# Patient Record
Sex: MALE | Race: WHITE | Employment: OTHER | URBAN - METROPOLITAN AREA
[De-identification: names, ages, dates, MRNs, and addresses within clinical notes are randomized per-mention and may not be internally consistent; named-entity substitution may affect disease eponyms.]

---

## 2022-12-30 ENCOUNTER — HOSPITAL ENCOUNTER (INPATIENT)
Age: 82
LOS: 1 days | Discharge: HOME | DRG: 091 | End: 2022-12-31
Attending: EMERGENCY MEDICINE | Admitting: INTERNAL MEDICINE
Payer: MEDICARE

## 2022-12-30 ENCOUNTER — APPOINTMENT (OUTPATIENT)
Dept: CT IMAGING | Age: 82
DRG: 091 | End: 2022-12-30
Payer: MEDICARE

## 2022-12-30 ENCOUNTER — APPOINTMENT (OUTPATIENT)
Dept: GENERAL RADIOLOGY | Age: 82
DRG: 091 | End: 2022-12-30
Payer: MEDICARE

## 2022-12-30 ENCOUNTER — APPOINTMENT (OUTPATIENT)
Dept: ULTRASOUND IMAGING | Age: 82
DRG: 091 | End: 2022-12-30
Payer: MEDICARE

## 2022-12-30 DIAGNOSIS — R41.0 CONFUSION: Primary | ICD-10-CM

## 2022-12-30 PROBLEM — R41.82 ALTERED MENTAL STATUS, UNSPECIFIED ALTERED MENTAL STATUS TYPE: Status: ACTIVE | Noted: 2022-12-30

## 2022-12-30 PROBLEM — G93.40 ACUTE ENCEPHALOPATHY: Status: ACTIVE | Noted: 2022-12-30

## 2022-12-30 LAB
ABSOLUTE EOS #: 0.11 K/UL (ref 0–0.44)
ABSOLUTE IMMATURE GRANULOCYTE: <0.03 K/UL (ref 0–0.3)
ABSOLUTE LYMPH #: 1.37 K/UL (ref 1.1–3.7)
ABSOLUTE MONO #: 0.46 K/UL (ref 0.1–1.2)
ALBUMIN SERPL-MCNC: 3.9 G/DL (ref 3.5–5.2)
ALBUMIN/GLOBULIN RATIO: 1.5 (ref 1–2.5)
ALP BLD-CCNC: 73 U/L (ref 40–129)
ALT SERPL-CCNC: 10 U/L (ref 5–41)
AMMONIA: 16 UMOL/L (ref 16–60)
ANION GAP SERPL CALCULATED.3IONS-SCNC: 11 MMOL/L (ref 9–17)
AST SERPL-CCNC: 17 U/L
BASOPHILS # BLD: 1 % (ref 0–2)
BASOPHILS ABSOLUTE: 0.04 K/UL (ref 0–0.2)
BILIRUB SERPL-MCNC: 0.3 MG/DL (ref 0.3–1.2)
BILIRUBIN DIRECT: 0.1 MG/DL
BILIRUBIN URINE: NEGATIVE
BILIRUBIN, INDIRECT: 0.2 MG/DL (ref 0–1)
BUN BLDV-MCNC: 15 MG/DL (ref 8–23)
CALCIUM SERPL-MCNC: 8.9 MG/DL (ref 8.6–10.4)
CARBOXYHEMOGLOBIN: 5.3 % (ref 0–5)
CASTS UA: ABNORMAL /LPF (ref 0–2)
CASTS UA: ABNORMAL /LPF (ref 0–2)
CHLORIDE BLD-SCNC: 101 MMOL/L (ref 98–107)
CO2: 25 MMOL/L (ref 20–31)
COLOR: YELLOW
CREAT SERPL-MCNC: 1.02 MG/DL (ref 0.7–1.2)
CRYSTALS, UA: ABNORMAL /HPF
CRYSTALS, UA: ABNORMAL /HPF
EOSINOPHILS RELATIVE PERCENT: 2 % (ref 1–4)
EPITHELIAL CELLS UA: ABNORMAL /HPF (ref 0–5)
FIO2: ABNORMAL
GFR SERPL CREATININE-BSD FRML MDRD: >60 ML/MIN/1.73M2
GLUCOSE BLD-MCNC: 148 MG/DL (ref 75–110)
GLUCOSE BLD-MCNC: 158 MG/DL (ref 70–99)
GLUCOSE URINE: NEGATIVE
HCO3 VENOUS: 24.9 MMOL/L (ref 24–30)
HCT VFR BLD CALC: 42.9 % (ref 40.7–50.3)
HEMOGLOBIN: 14.2 G/DL (ref 13–17)
IMMATURE GRANULOCYTES: 0 %
KETONES, URINE: NEGATIVE
LEUKOCYTE ESTERASE, URINE: NEGATIVE
LYMPHOCYTES # BLD: 27 % (ref 24–43)
MCH RBC QN AUTO: 32.4 PG (ref 25.2–33.5)
MCHC RBC AUTO-ENTMCNC: 33.1 G/DL (ref 28.4–34.8)
MCV RBC AUTO: 97.9 FL (ref 82.6–102.9)
MONOCYTES # BLD: 9 % (ref 3–12)
MUCUS: ABNORMAL
NEGATIVE BASE EXCESS, VEN: 1.4 MMOL/L (ref 0–2)
NITRITE, URINE: NEGATIVE
NRBC AUTOMATED: 0 PER 100 WBC
O2 SAT, VEN: 64.9 % (ref 60–85)
PATIENT TEMP: 37
PCO2, VEN: 50.5 MM HG (ref 39–55)
PDW BLD-RTO: 14.4 % (ref 11.8–14.4)
PH UA: 6 (ref 5–8)
PH VENOUS: 7.31 (ref 7.32–7.42)
PLATELET # BLD: 158 K/UL (ref 138–453)
PMV BLD AUTO: 10.2 FL (ref 8.1–13.5)
PO2, VEN: 34.5 MM HG (ref 30–50)
POTASSIUM SERPL-SCNC: 4.5 MMOL/L (ref 3.7–5.3)
PROTEIN UA: ABNORMAL
RBC # BLD: 4.38 M/UL (ref 4.21–5.77)
RBC UA: ABNORMAL /HPF (ref 0–2)
SEG NEUTROPHILS: 61 % (ref 36–65)
SEGMENTED NEUTROPHILS ABSOLUTE COUNT: 3.09 K/UL (ref 1.5–8.1)
SODIUM BLD-SCNC: 137 MMOL/L (ref 135–144)
SPECIFIC GRAVITY UA: 1.02 (ref 1–1.03)
TOTAL PROTEIN: 6.5 G/DL (ref 6.4–8.3)
TROPONIN, HIGH SENSITIVITY: 25 NG/L (ref 0–22)
TROPONIN, HIGH SENSITIVITY: 25 NG/L (ref 0–22)
TSH SERPL DL<=0.05 MIU/L-ACNC: 2.85 UIU/ML (ref 0.3–5)
TURBIDITY: CLEAR
URINE HGB: NEGATIVE
UROBILINOGEN, URINE: NORMAL
WBC # BLD: 5.1 K/UL (ref 3.5–11.3)
WBC UA: ABNORMAL /HPF (ref 0–5)

## 2022-12-30 PROCEDURE — 87040 BLOOD CULTURE FOR BACTERIA: CPT

## 2022-12-30 PROCEDURE — 80048 BASIC METABOLIC PNL TOTAL CA: CPT

## 2022-12-30 PROCEDURE — 84443 ASSAY THYROID STIM HORMONE: CPT

## 2022-12-30 PROCEDURE — 81001 URINALYSIS AUTO W/SCOPE: CPT

## 2022-12-30 PROCEDURE — 99285 EMERGENCY DEPT VISIT HI MDM: CPT

## 2022-12-30 PROCEDURE — 82805 BLOOD GASES W/O2 SATURATION: CPT

## 2022-12-30 PROCEDURE — 84484 ASSAY OF TROPONIN QUANT: CPT

## 2022-12-30 PROCEDURE — 87086 URINE CULTURE/COLONY COUNT: CPT

## 2022-12-30 PROCEDURE — 36415 COLL VENOUS BLD VENIPUNCTURE: CPT

## 2022-12-30 PROCEDURE — 93005 ELECTROCARDIOGRAM TRACING: CPT | Performed by: STUDENT IN AN ORGANIZED HEALTH CARE EDUCATION/TRAINING PROGRAM

## 2022-12-30 PROCEDURE — 2580000003 HC RX 258: Performed by: STUDENT IN AN ORGANIZED HEALTH CARE EDUCATION/TRAINING PROGRAM

## 2022-12-30 PROCEDURE — 2500000003 HC RX 250 WO HCPCS: Performed by: STUDENT IN AN ORGANIZED HEALTH CARE EDUCATION/TRAINING PROGRAM

## 2022-12-30 PROCEDURE — 82140 ASSAY OF AMMONIA: CPT

## 2022-12-30 PROCEDURE — 70450 CT HEAD/BRAIN W/O DYE: CPT

## 2022-12-30 PROCEDURE — 2060000000 HC ICU INTERMEDIATE R&B

## 2022-12-30 PROCEDURE — 82947 ASSAY GLUCOSE BLOOD QUANT: CPT

## 2022-12-30 PROCEDURE — 1200000000 HC SEMI PRIVATE

## 2022-12-30 PROCEDURE — 76770 US EXAM ABDO BACK WALL COMP: CPT

## 2022-12-30 PROCEDURE — 85025 COMPLETE CBC W/AUTO DIFF WBC: CPT

## 2022-12-30 PROCEDURE — 51798 US URINE CAPACITY MEASURE: CPT

## 2022-12-30 PROCEDURE — 80076 HEPATIC FUNCTION PANEL: CPT

## 2022-12-30 RX ORDER — LEVOTHYROXINE SODIUM 0.1 MG/1
100 TABLET ORAL DAILY
COMMUNITY

## 2022-12-30 RX ORDER — ONDANSETRON 4 MG/1
4 TABLET, ORALLY DISINTEGRATING ORAL EVERY 8 HOURS PRN
Status: DISCONTINUED | OUTPATIENT
Start: 2022-12-30 | End: 2022-12-31 | Stop reason: HOSPADM

## 2022-12-30 RX ORDER — SODIUM CHLORIDE 9 MG/ML
INJECTION, SOLUTION INTRAVENOUS PRN
Status: DISCONTINUED | OUTPATIENT
Start: 2022-12-30 | End: 2022-12-31 | Stop reason: HOSPADM

## 2022-12-30 RX ORDER — ATENOLOL 50 MG/1
50 TABLET ORAL NIGHTLY
Status: DISCONTINUED | OUTPATIENT
Start: 2022-12-31 | End: 2022-12-31 | Stop reason: HOSPADM

## 2022-12-30 RX ORDER — OXYCODONE HYDROCHLORIDE 15 MG/1
15 TABLET ORAL 2 TIMES DAILY
COMMUNITY

## 2022-12-30 RX ORDER — LEVOTHYROXINE SODIUM 0.1 MG/1
100 TABLET ORAL DAILY
Status: DISCONTINUED | OUTPATIENT
Start: 2022-12-31 | End: 2022-12-31

## 2022-12-30 RX ORDER — DULOXETIN HYDROCHLORIDE 60 MG/1
60 CAPSULE, DELAYED RELEASE ORAL NIGHTLY
Status: ON HOLD | COMMUNITY
End: 2022-12-31 | Stop reason: HOSPADM

## 2022-12-30 RX ORDER — ATENOLOL 50 MG/1
50 TABLET ORAL NIGHTLY
Status: ON HOLD | COMMUNITY
End: 2022-12-31 | Stop reason: HOSPADM

## 2022-12-30 RX ORDER — GABAPENTIN 600 MG/1
600 TABLET ORAL 2 TIMES DAILY
COMMUNITY

## 2022-12-30 RX ORDER — MORPHINE SULFATE 15 MG/1
15 TABLET ORAL 2 TIMES DAILY
COMMUNITY

## 2022-12-30 RX ORDER — SODIUM CHLORIDE 0.9 % (FLUSH) 0.9 %
5-40 SYRINGE (ML) INJECTION EVERY 12 HOURS SCHEDULED
Status: DISCONTINUED | OUTPATIENT
Start: 2022-12-31 | End: 2022-12-31 | Stop reason: HOSPADM

## 2022-12-30 RX ORDER — ACETAMINOPHEN 650 MG/1
650 SUPPOSITORY RECTAL EVERY 6 HOURS PRN
Status: DISCONTINUED | OUTPATIENT
Start: 2022-12-30 | End: 2022-12-31 | Stop reason: HOSPADM

## 2022-12-30 RX ORDER — ASPIRIN 81 MG/1
81 TABLET ORAL DAILY
Status: DISCONTINUED | OUTPATIENT
Start: 2022-12-31 | End: 2022-12-31 | Stop reason: HOSPADM

## 2022-12-30 RX ORDER — SODIUM CHLORIDE 0.9 % (FLUSH) 0.9 %
5-40 SYRINGE (ML) INJECTION PRN
Status: DISCONTINUED | OUTPATIENT
Start: 2022-12-30 | End: 2022-12-31 | Stop reason: HOSPADM

## 2022-12-30 RX ORDER — NICARDIPINE HYDROCHLORIDE 0.1 MG/ML
INJECTION INTRAVENOUS
Status: DISPENSED
Start: 2022-12-30 | End: 2022-12-31

## 2022-12-30 RX ORDER — ONDANSETRON 2 MG/ML
4 INJECTION INTRAMUSCULAR; INTRAVENOUS EVERY 6 HOURS PRN
Status: DISCONTINUED | OUTPATIENT
Start: 2022-12-30 | End: 2022-12-31 | Stop reason: HOSPADM

## 2022-12-30 RX ORDER — FOLIC ACID 1 MG/1
1 TABLET ORAL DAILY
COMMUNITY

## 2022-12-30 RX ORDER — POLYETHYLENE GLYCOL 3350 17 G/17G
17 POWDER, FOR SOLUTION ORAL DAILY PRN
Status: DISCONTINUED | OUTPATIENT
Start: 2022-12-30 | End: 2022-12-31 | Stop reason: HOSPADM

## 2022-12-30 RX ORDER — ASPIRIN 81 MG/1
81 TABLET ORAL DAILY
COMMUNITY

## 2022-12-30 RX ORDER — ATORVASTATIN CALCIUM 40 MG/1
40 TABLET, FILM COATED ORAL DAILY
COMMUNITY

## 2022-12-30 RX ORDER — ENOXAPARIN SODIUM 100 MG/ML
40 INJECTION SUBCUTANEOUS DAILY
Status: DISCONTINUED | OUTPATIENT
Start: 2022-12-31 | End: 2022-12-31 | Stop reason: HOSPADM

## 2022-12-30 RX ORDER — ATORVASTATIN CALCIUM 40 MG/1
40 TABLET, FILM COATED ORAL DAILY
Status: DISCONTINUED | OUTPATIENT
Start: 2022-12-31 | End: 2022-12-31 | Stop reason: HOSPADM

## 2022-12-30 RX ORDER — ACETAMINOPHEN 325 MG/1
650 TABLET ORAL EVERY 6 HOURS PRN
Status: DISCONTINUED | OUTPATIENT
Start: 2022-12-30 | End: 2022-12-31 | Stop reason: HOSPADM

## 2022-12-30 RX ADMIN — SODIUM CHLORIDE 5 MG/HR: 9 INJECTION, SOLUTION INTRAVENOUS at 20:25

## 2022-12-30 ASSESSMENT — ENCOUNTER SYMPTOMS
SHORTNESS OF BREATH: 0
ABDOMINAL PAIN: 0

## 2022-12-30 ASSESSMENT — PAIN DESCRIPTION - LOCATION: LOCATION: BACK

## 2022-12-30 ASSESSMENT — PAIN DESCRIPTION - PAIN TYPE: TYPE: CHRONIC PAIN

## 2022-12-30 ASSESSMENT — LIFESTYLE VARIABLES
HOW MANY STANDARD DRINKS CONTAINING ALCOHOL DO YOU HAVE ON A TYPICAL DAY: PATIENT DOES NOT DRINK
HOW OFTEN DO YOU HAVE A DRINK CONTAINING ALCOHOL: NEVER

## 2022-12-30 ASSESSMENT — PAIN SCALES - GENERAL: PAINLEVEL_OUTOF10: 6

## 2022-12-30 ASSESSMENT — PAIN - FUNCTIONAL ASSESSMENT: PAIN_FUNCTIONAL_ASSESSMENT: 0-10

## 2022-12-30 ASSESSMENT — PAIN DESCRIPTION - DESCRIPTORS: DESCRIPTORS: ACHING

## 2022-12-30 NOTE — ED PROVIDER NOTES
[facility]  Emergency Department  Emergency Medicine Resident Sign-out     Care of Elia Cat was assumed from Dr. Rea Rodríguez and is being seen for Hallucinations  . The patient's initial evaluation and plan have been discussed with the prior provider who initially evaluated the patient. EMERGENCY DEPARTMENT COURSE / MEDICAL DECISION MAKING:       MEDICATIONS GIVEN:  Orders Placed This Encounter   Medications    niCARdipine (CARDENE) 25 mg in sodium chloride 0.9 % 250 mL infusion     Order Specific Question:   Titrate Infusion? Answer:   Yes     Order Specific Question:   Initial Infusion Rate: Answer:   5 mg/hr     Order Specific Question:   Goal of Therapy is: Answer: Other     Order Specific Question:   Other Goal:     Answer:   SBP less than 170     Order Specific Question:   Contact Provider if:     Answer:   Patient is receiving the maximum dose and is not achieving the goal of therapy    niCARdipine in sodium chloride (CARDENE) 20-0.9 MG/200ML-% infusion     Cony Navarrete: cabinet override       LABS / RADIOLOGY:     Results for orders placed or performed during the hospital encounter of 12/30/22   Culture, Blood 1    Specimen: Blood   Result Value Ref Range    Specimen Description . BLOOD     Special Requests  L FOREARM 10 ML     Culture NO GROWTH <24 HRS    Culture, Blood 1    Specimen: Blood   Result Value Ref Range    Specimen Description . BLOOD     Special Requests  L AC 10 ML     Culture NO GROWTH <24 HRS    CBC with Auto Differential   Result Value Ref Range    WBC 5.1 3.5 - 11.3 k/uL    RBC 4.38 4.21 - 5.77 m/uL    Hemoglobin 14.2 13.0 - 17.0 g/dL    Hematocrit 42.9 40.7 - 50.3 %    MCV 97.9 82.6 - 102.9 fL    MCH 32.4 25.2 - 33.5 pg    MCHC 33.1 28.4 - 34.8 g/dL    RDW 14.4 11.8 - 14.4 %    Platelets 009 978 - 530 k/uL    MPV 10.2 8.1 - 13.5 fL    NRBC Automated 0.0 0.0 per 100 WBC    Seg Neutrophils 61 36 - 65 %    Lymphocytes 27 24 - 43 % Monocytes 9 3 - 12 %    Eosinophils % 2 1 - 4 %    Basophils 1 0 - 2 %    Immature Granulocytes 0 0 %    Segs Absolute 3.09 1.50 - 8.10 k/uL    Absolute Lymph # 1.37 1.10 - 3.70 k/uL    Absolute Mono # 0.46 0.10 - 1.20 k/uL    Absolute Eos # 0.11 0.00 - 0.44 k/uL    Basophils Absolute 0.04 0.00 - 0.20 k/uL    Absolute Immature Granulocyte <0.03 0.00 - 0.30 k/uL   BMP   Result Value Ref Range    Glucose 158 (H) 70 - 99 mg/dL    BUN 15 8 - 23 mg/dL    Creatinine 1.02 0.70 - 1.20 mg/dL    Est, Glom Filt Rate >60 >60 mL/min/1.73m2    Calcium 8.9 8.6 - 10.4 mg/dL    Sodium 137 135 - 144 mmol/L    Potassium 4.5 3.7 - 5.3 mmol/L    Chloride 101 98 - 107 mmol/L    CO2 25 20 - 31 mmol/L    Anion Gap 11 9 - 17 mmol/L   Urinalysis with Reflex to Culture    Specimen: Urine   Result Value Ref Range    Color, UA Yellow Yellow    Turbidity UA Clear Clear    Glucose, Ur NEGATIVE NEGATIVE    Bilirubin Urine NEGATIVE NEGATIVE    Ketones, Urine NEGATIVE NEGATIVE    Specific Gravity, UA 1.018 1.005 - 1.030    Urine Hgb NEGATIVE NEGATIVE    pH, UA 6.0 5.0 - 8.0    Protein, UA 2+ (A) NEGATIVE    Urobilinogen, Urine Normal Normal    Nitrite, Urine NEGATIVE NEGATIVE    Leukocyte Esterase, Urine NEGATIVE NEGATIVE   Hepatic Function Panel   Result Value Ref Range    Albumin 3.9 3.5 - 5.2 g/dL    Alkaline Phosphatase 73 40 - 129 U/L    ALT 10 5 - 41 U/L    AST 17 <40 U/L    Total Bilirubin 0.3 0.3 - 1.2 mg/dL    Bilirubin, Direct 0.1 <0.3 mg/dL    Bilirubin, Indirect 0.2 0.0 - 1.0 mg/dL    Total Protein 6.5 6.4 - 8.3 g/dL    Albumin/Globulin Ratio 1.5 1.0 - 2.5   TSH with Reflex   Result Value Ref Range    TSH 2.85 0.30 - 5.00 uIU/mL   BLOOD GAS, VENOUS   Result Value Ref Range    pH, Coy 7.314 (L) 7.320 - 7.420    pCO2, Coy 50.5 39 - 55 mm Hg    pO2, Coy 34.5 30 - 50 mm Hg    HCO3, Venous 24.9 24 - 30 mmol/L    Negative Base Excess, Coy 1.4 0.0 - 2.0 mmol/L    O2 Sat, Coy 64.9 60.0 - 85.0 %    Carboxyhemoglobin 5.3 (H) 0 - 5 %    Pt Temp  37.0     FIO2 INFORMATION NOT PROVIDED    Troponin   Result Value Ref Range    Troponin, High Sensitivity 25 (H) 0 - 22 ng/L   Ammonia   Result Value Ref Range    Ammonia 16 16 - 60 umol/L   Troponin   Result Value Ref Range    Troponin, High Sensitivity 25 (H) 0 - 22 ng/L   Microscopic Urinalysis   Result Value Ref Range    WBC, UA 2 TO 5 0 - 5 /HPF    RBC, UA 0 TO 2 0 - 2 /HPF    Casts UA 20 TO 50 0 - 2 /LPF    Casts UA HYALINE 0 - 2 /LPF    Crystals, UA CALCIUM OXALATE (A) None /HPF    Crystals, UA MANY (A) None /HPF    Epithelial Cells UA 0 TO 2 0 - 5 /HPF    Mucus, UA 1+ (A) None   POC Glucose Fingerstick   Result Value Ref Range    POC Glucose 148 (H) 75 - 110 mg/dL       CT HEAD WO CONTRAST    Result Date: 12/30/2022  EXAMINATION: CT OF THE HEAD WITHOUT CONTRAST  12/30/2022 12:42 pm TECHNIQUE: CT of the head was performed without the administration of intravenous contrast. Automated exposure control, iterative reconstruction, and/or weight based adjustment of the mA/kV was utilized to reduce the radiation dose to as low as reasonably achievable. COMPARISON: None. HISTORY: ORDERING SYSTEM PROVIDED HISTORY: Altered mental status TECHNOLOGIST PROVIDED HISTORY: Altered mental status Decision Support Exception - unselect if not a suspected or confirmed emergency medical condition->Emergency Medical Condition (MA) CT BRAIN FINDINGS: BRAIN/VENTRICLES: The cerebral hemispheres, brainstem, and cerebellum have a normal appearance for the patient's age. The falx is midline. The ventricles and peripheral sulci are mildly dilated. There is decreased attenuation in the periventricular white matter. There is no sign of a space occupying lesion, infarction, or hemorrhage. Orbits: Portion of the orbits demonstrate no acute abnormality. SINUSES: . The  imaged portions of the paranasal sinuses are clear. The mastoids and the middle ear chambers are clear.  SOFT TISSUES/SKULL:  No acute abnormality of the visualized skull or soft tissues. Vascular calcifications are seen compatible with atherosclerotic disease. Mild central and cortical cerebral atrophy. Mild chronic deep white matter ischemic changes No acute intracranial abnormalities are noted. Vasiliy Luna RECENT VITALS:     Temp: 98.5 °F (36.9 °C),  Heart Rate: 64, Resp: 19, BP: (!) 139/117, SpO2: 92 %    This patient is a 79 y.o. Male with concerns for altered mental status. According to family patient has these episodes where he is confused and not acting appropriately but snaps out of it pretty quickly. According to wife patient was trying to get ice cream from the oven today. States it typically snaps out however today he did not snap out of it until approximately 3 PM.  Given this family was concerned and brought him to the emergency department. Does have a history for Alzheimer's dementia with behavioral disturbance, diabetes, PAD as well as osteomyelitis. Laboratory testing mostly unremarkable. Awaiting second troponin. Laboratory testing unremarkable. Given patient's acute delirium on top of known dementia patient admitted to internal medicine. Patient placed on Cardene drip by internal medicine. Admitted to their service. OUTSTANDING TASKS / RECOMMENDATIONS:    Troponin  Reassess/dispo     FINAL IMPRESSION:     1. Confusion        DISPOSITION:         DISPOSITION:  []  Home   []  Nursing Facility   []  Transfer -    [x]  Admission -  Internal Medicine   FOLLOW-UP: No follow-up provider specified.    DISCHARGE MEDICATIONS: New Prescriptions    No medications on file          Isra Chavez DO  Emergency Medicine Resident  [place], [place]  Resident  12/30/22 0993

## 2022-12-30 NOTE — ED PROVIDER NOTES
[facility] ED  Emergency Department Encounter  Emergency Medicine Resident     Pt Name: Selina Vazquez  MRN: [id number]  Davontegfjose 1940  Date of evaluation: 12/30/22  PCP:  Kentrell Dee MD    [address] [place] [address]       Chief Complaint   Patient presents with    Hallucinations       HISTORY OFPRESENT ILLNESS  (Location/Symptom, Timing/Onset, Context/Setting, Quality, Duration, Modifying Factors,Severity.)      Selina Vazquez is a 79 y. o.yo male who presents with concern for abnormal behavior. Patient presents with wife and son who provide [de-identified] of history. They state patient does have a history of dementia. They state he typically has episodes of odd behavior however these are typically short-lived. They state however this morning around 8 AM patient started having odd behavior and has persisted for approximately 8 hours they wanted patient to be evaluated. They state patient is doing odd things such as attempted to look for a scream in the oven. They state patient also has been stating he is seeing people and things that are not there. Patient himself has no complaints. PAST MEDICAL / SURGICAL / SOCIAL / FAMILY HISTORY      has no past medical history on file. has no past surgical history on file.      Social History     Socioeconomic History    Marital status:      Spouse name: Not on file    Number of children: Not on file    Years of education: Not on file    Highest education level: Not on file   Occupational History    Not on file   Tobacco Use    Smoking status: Every Day     Packs/day: 1.00     Types: Cigarettes    Smokeless tobacco: Not on file   Substance and Sexual Activity    Alcohol use: Not Currently    Drug use: Never    Sexual activity: Not Currently   Other Topics Concern    Not on file   Social History Narrative    Not on file     Social Determinants of Health     Financial Resource Strain: Not on file   Food Insecurity: Not on file   Transportation Needs: Not on file   Physical Activity: Not on file   Stress: Not on file   Social Connections: Not on file   Intimate Partner Violence: Not on file   Housing Stability: Not on file       History reviewed. No pertinent family history. Allergies:  Patient has no known allergies. Home Medications:  Prior to Admission medications    Medication Sig Start Date End Date Taking? Authorizing Provider   gabapentin (NEURONTIN) 600 MG tablet Take 600 mg by mouth 2 times daily. 1 tab in the morning, 2 at bedtime   Yes Historical Provider, MD   metFORMIN (GLUCOPHAGE) 500 MG tablet Take 1,000 mg by mouth 2 times daily (with meals)   Yes Historical Provider, MD   levothyroxine (SYNTHROID) 100 MCG tablet Take 100 mcg by mouth Daily . 025mg one daily   Yes Historical Provider, MD   DULoxetine (CYMBALTA) 60 MG extended release capsule Take 60 mg by mouth nightly   Yes Historical Provider, MD   folic acid (FOLVITE) 1 MG tablet Take 1 mg by mouth daily   Yes Historical Provider, MD   atenolol (TENORMIN) 50 MG tablet Take 50 mg by mouth nightly   Yes Historical Provider, MD   atorvastatin (LIPITOR) 40 MG tablet Take 40 mg by mouth daily   Yes Historical Provider, MD   oxyCODONE (OXY-IR) 15 MG immediate release tablet Take 15 mg by mouth 2 times daily. Yes Historical Provider, MD   morphine (MSIR) 15 MG tablet Take 15 mg by mouth 2 times daily. Yes Historical Provider, MD   aspirin 81 MG EC tablet Take 81 mg by mouth daily   Yes Historical Provider, MD       REVIEW OFSYSTEMS    (2-9 systems for level 4, 10 or more for level 5)      Review of Systems   Constitutional:  Negative for fever. HENT:  Negative for congestion. Eyes:  Positive for visual disturbance. Respiratory:  Negative for shortness of breath. Cardiovascular:  Negative for chest pain. Gastrointestinal:  Negative for abdominal pain. Genitourinary:  Negative for dysuria and frequency. Musculoskeletal:  Negative for gait problem.    Skin:  Negative for rash.   Neurological:  Negative for headaches. Psychiatric/Behavioral:  Positive for behavioral problems, confusion and hallucinations. PHYSICAL EXAM   (up to 7 for level 4, 8 or more forlevel 5)      INITIAL VITALS:   Vitals:    12/30/22 1715 12/30/22 1843 12/30/22 1910 12/30/22 1915   BP: (!) 160/63 (!) 188/91  (!) 206/64   Pulse: 55 56 57    Resp: 14 16 12    Temp:       TempSrc:       SpO2:  97% 94%    Weight:       Height:             Physical Exam  Vitals reviewed. Constitutional:       General: He is not in acute distress. Appearance: Normal appearance. He is not ill-appearing. HENT:      Head: Normocephalic and atraumatic. Right Ear: External ear normal.      Left Ear: External ear normal.      Nose: Nose normal.      Mouth/Throat:      Mouth: Mucous membranes are moist.   Eyes:      General:         Right eye: No discharge. Left eye: No discharge. Cardiovascular:      Rate and Rhythm: Normal rate and regular rhythm. Pulses: Normal pulses. Pulmonary:      Effort: Pulmonary effort is normal. No respiratory distress. Abdominal:      General: There is no distension. Palpations: Abdomen is soft. Tenderness: There is no abdominal tenderness. Musculoskeletal:      Cervical back: Normal range of motion. Comments: Moving all 4 extremity   Skin:     General: Skin is warm. Capillary Refill: Capillary refill takes less than 2 seconds. Neurological:      General: No focal deficit present. Mental Status: He is alert and oriented to person, place, and time. Cranial Nerves: No cranial nerve deficit.    Psychiatric:         Mood and Affect: Mood normal.       DIFFERENTIAL  DIAGNOSIS     PLAN (LABS / IMAGING / EKG):  Orders Placed This Encounter   Procedures    Culture, Blood 1    Culture, Blood 1    Culture, Urine    CT HEAD WO CONTRAST    US RENAL COMPLETE    CBC with Auto Differential    BMP    Urinalysis with Reflex to Culture    Hepatic Function Panel    TSH with Reflex    BLOOD GAS, VENOUS    Troponin    Ammonia    Troponin    Microscopic Urinalysis    Telemetry monitoring - 48 hour duration    Bladder scan    Inpatient consult to Internal Medicine    POC Glucose Fingerstick    EKG 12 Lead    ADMIT TO INPATIENT    ADMIT TO INPATIENT       MEDICATIONS ORDERED:  Orders Placed This Encounter   Medications    niCARdipine (CARDENE) 25 mg in sodium chloride 0.9 % 250 mL infusion     Order Specific Question:   Titrate Infusion? Answer:   Yes     Order Specific Question:   Initial Infusion Rate: Answer:   5 mg/hr     Order Specific Question:   Goal of Therapy is: Answer: Other     Order Specific Question:   Other Goal:     Answer:   SBP less than 170     Order Specific Question:   Contact Provider if:     Answer:   Patient is receiving the maximum dose and is not achieving the goal of therapy    niCARdipine in sodium chloride (CARDENE) 20-0.9 MG/200ML-% infusion     YadieledCony: cabinet override       DDX: Progressive dementia, psychiatric abnormality, intracranial abnormality, electrolyte abnormality, infection, anemia, dehydration, other    Initial MDM/Plan: 79 y.o. male who presents with odd behavior. Patient has history of dementia however family states patient is having prolonged episodes of confusion, odd behavior. Patient himself has no complaints. Patient well-appearing at evaluation, afebrile, stable vital signs. Benign physical exam.  Nonfocal neurologic exam.  Will obtain broad laboratory work-up to further investigate. Will obtain CT head as well.   Will reassess    DIAGNOSTIC RESULTS / EMERGENCYDEPARTMENT COURSE / MDM     LABS:  Labs Reviewed   BASIC METABOLIC PANEL - Abnormal; Notable for the following components:       Result Value    Glucose 158 (*)     All other components within normal limits   URINALYSIS WITH REFLEX TO CULTURE - Abnormal; Notable for the following components:    Protein, UA 2+ (*)     All other components within normal limits   BLOOD GAS, VENOUS - Abnormal; Notable for the following components:    pH, Coy 7.314 (*)     Carboxyhemoglobin 5.3 (*)     All other components within normal limits   TROPONIN - Abnormal; Notable for the following components:    Troponin, High Sensitivity 25 (*)     All other components within normal limits   TROPONIN - Abnormal; Notable for the following components:    Troponin, High Sensitivity 25 (*)     All other components within normal limits   MICROSCOPIC URINALYSIS - Abnormal; Notable for the following components:    Crystals, UA CALCIUM OXALATE (*)     Crystals, UA MANY (*)     Mucus, UA 1+ (*)     All other components within normal limits   POC GLUCOSE FINGERSTICK - Abnormal; Notable for the following components:    POC Glucose 148 (*)     All other components within normal limits   CULTURE, BLOOD 1   CULTURE, BLOOD 1   CULTURE, URINE   CBC WITH AUTO DIFFERENTIAL   HEPATIC FUNCTION PANEL   TSH WITH REFLEX   AMMONIA         RADIOLOGY:  CT HEAD WO CONTRAST    Result Date: 12/30/2022  EXAMINATION: CT OF THE HEAD WITHOUT CONTRAST  12/30/2022 12:42 pm TECHNIQUE: CT of the head was performed without the administration of intravenous contrast. Automated exposure control, iterative reconstruction, and/or weight based adjustment of the mA/kV was utilized to reduce the radiation dose to as low as reasonably achievable. COMPARISON: None. HISTORY: ORDERING SYSTEM PROVIDED HISTORY: Altered mental status TECHNOLOGIST PROVIDED HISTORY: Altered mental status Decision Support Exception - unselect if not a suspected or confirmed emergency medical condition->Emergency Medical Condition (MA) CT BRAIN FINDINGS: BRAIN/VENTRICLES: The cerebral hemispheres, brainstem, and cerebellum have a normal appearance for the patient's age. The falx is midline. The ventricles and peripheral sulci are mildly dilated. There is decreased attenuation in the periventricular white matter.  There is no sign of a space occupying lesion, infarction, or hemorrhage. Orbits: Portion of the orbits demonstrate no acute abnormality. SINUSES: . The  imaged portions of the paranasal sinuses are clear. The mastoids and the middle ear chambers are clear. SOFT TISSUES/SKULL:  No acute abnormality of the visualized skull or soft tissues. Vascular calcifications are seen compatible with atherosclerotic disease. Mild central and cortical cerebral atrophy. Mild chronic deep white matter ischemic changes No acute intracranial abnormalities are noted. [facility] EMERGENCY DEPARTMENT COURSE:  ED Course as of 12/30/22 2018   Fri Dec 30, 2022   1538 WBC: 5.1 [AB]   1538 Hemoglobin Quant: 14.2 [AB]   1638 TSH: 2.85 [AB]   1638 Creatinine: 1.02 [AB]   1638 LFTs within normal limits [AB]   1638 Troponin, High Sensitivity(!): 25  Will trend [AB]   1642 CT HEAD WO CONTRAST  IMPRESSION:  Mild central and cortical cerebral atrophy. Mild chronic deep white matter ischemic changes     No acute intracranial abnormalities are noted. [AB]   4691 Patient signed out to oncoming resident awaiting further work-up and reevaluation [AB]   [phone] Discussed with family as well as patient and they are agreeable to admission. Will contact internal medicine group [MS]      ED Course User Index  [AB] Margie Junior DO  [MS] Alf Roberts DO          PROCEDURES:  None    CONSULTS:  IP CONSULT TO INTERNAL MEDICINE  IP CONSULT TO CASE MANAGEMENT    CRITICAL CARE:  See attending physician note    FINAL IMPRESSION      1. Confusion          DISPOSITION / PLAN     DISPOSITION Admitted 12/30/2022 07:12:25 PM      PATIENT REFERRED TO:  No follow-up provider specified.     DISCHARGE MEDICATIONS:  New Prescriptions    No medications on file       Olimpia De La O DO  Emergency Medicine Resident    (Please note that portions of this note were completed with a voice recognition program.Efforts were made to edit the dictations but occasionally words are mis-transcribed.) Cliff Thakur DO  Resident  12/30/22 2018

## 2022-12-30 NOTE — ED NOTES
Pt arrives to the ER via ambulance . Pts family has noted that pt is more confused lately and hallucinates. Usually, pts family states he \"snaps out of it after a few minutes, this time, he hasnt \" PT has no complaints accept chronic back pain. Pt is alert and oriented , but has periods where he gets confused.      Ling Garcia RN  12/30/22 4424

## 2022-12-30 NOTE — ED PROVIDER NOTES
[facility] ED     Emergency Department     Faculty Attestation        I performed a history and physical examination of the patient and discussed management with the resident. I reviewed the residents note and agree with the documented findings and plan of care. Any areas of disagreement are noted on the chart. I was personally present for the key portions of any procedures. I have documented in the chart those procedures where I was not present during the key portions. I have reviewed the emergency nurses triage note. I agree with the chief complaint, past medical history, past surgical history, allergies, medications, social and family history as documented unless otherwise noted below. For mid-level providers such as nurse practitioners as well as physicians assistants:    I have personally seen and evaluated the patient. I find the patient's history and physical exam are consistent with NP/PA documentation. I agree with the care provided, treatment rendered, disposition, & follow-up plan. Additional findings are as noted. Vital Signs: BP (!) 191/63   Pulse 58   Temp 98.5 °F (36.9 °C) (Oral)   Resp 16   Ht 5' 7\" (1.702 m)   Wt 145 lb (65.8 kg)   SpO2 96%   BMI 22.71 kg/m²   PCP:  Nas Leiva MD    Pertinent Comments:     Patient with a history of dementia and is more confused and hallucinating and has been going on for the past 12 hours.   No falls or trauma he is awake alert, intermittently oriented with a nonfocal neurological exam      Critical Care  None          Sapna Sarkar MD    Attending Emergency Medicine Physician            Nicole Lopes MD  12/30/22 8425

## 2022-12-30 NOTE — ED PROVIDER NOTES
Faculty Sign-Out Attestation  Handoff taken on the following patient from prior Attending Physician: Angele Rinne    I was available and discussed any additional care issues that arose and coordinated the management plans with the resident(s) caring for the patient during my duty period. Any areas of disagreement with residents documentation of care or procedures are noted on the chart. I was personally present for the key portions of any/all procedures during my duty period. I have documented in the chart those procedures where I was not present during the key portions. 1year-old male with a history of dementia presenting with acute delirium, hallucinations, putting ice cream in the microwave, etc. AMS work-up revealed mildly elevated troponin, no other clear cause of mental status change. Will admit for further management.     Yessenia Topete MD  Attending Physician        Yessenia Topete MD  12/30/22 1128

## 2022-12-31 ENCOUNTER — APPOINTMENT (OUTPATIENT)
Dept: CT IMAGING | Age: 82
DRG: 091 | End: 2022-12-31
Payer: MEDICARE

## 2022-12-31 ENCOUNTER — APPOINTMENT (OUTPATIENT)
Dept: GENERAL RADIOLOGY | Age: 82
DRG: 091 | End: 2022-12-31
Payer: MEDICARE

## 2022-12-31 VITALS
BODY MASS INDEX: 22.76 KG/M2 | HEIGHT: 67 IN | WEIGHT: 145 LBS | TEMPERATURE: 98.1 F | RESPIRATION RATE: 25 BRPM | DIASTOLIC BLOOD PRESSURE: 80 MMHG | OXYGEN SATURATION: 95 % | SYSTOLIC BLOOD PRESSURE: 175 MMHG | HEART RATE: 64 BPM

## 2022-12-31 PROBLEM — R41.0 CONFUSION: Status: ACTIVE | Noted: 2022-12-31

## 2022-12-31 LAB
ABSOLUTE EOS #: 0.07 K/UL (ref 0–0.44)
ABSOLUTE IMMATURE GRANULOCYTE: 0.03 K/UL (ref 0–0.3)
ABSOLUTE LYMPH #: 1.55 K/UL (ref 1.1–3.7)
ABSOLUTE MONO #: 0.72 K/UL (ref 0.1–1.2)
ANION GAP SERPL CALCULATED.3IONS-SCNC: 7 MMOL/L (ref 9–17)
BASOPHILS # BLD: 1 % (ref 0–2)
BASOPHILS ABSOLUTE: 0.05 K/UL (ref 0–0.2)
BUN BLDV-MCNC: 12 MG/DL (ref 8–23)
CALCIUM SERPL-MCNC: 8.8 MG/DL (ref 8.6–10.4)
CHLORIDE BLD-SCNC: 98 MMOL/L (ref 98–107)
CO2: 24 MMOL/L (ref 20–31)
CREAT SERPL-MCNC: 0.79 MG/DL (ref 0.7–1.2)
CULTURE: NO GROWTH
EOSINOPHILS RELATIVE PERCENT: 1 % (ref 1–4)
GFR SERPL CREATININE-BSD FRML MDRD: >60 ML/MIN/1.73M2
GLUCOSE BLD-MCNC: 123 MG/DL (ref 70–99)
HCT VFR BLD CALC: 43.1 % (ref 40.7–50.3)
HEMOGLOBIN: 13.9 G/DL (ref 13–17)
IMMATURE GRANULOCYTES: 0 %
LYMPHOCYTES # BLD: 17 % (ref 24–43)
MCH RBC QN AUTO: 32 PG (ref 25.2–33.5)
MCHC RBC AUTO-ENTMCNC: 32.3 G/DL (ref 28.4–34.8)
MCV RBC AUTO: 99.1 FL (ref 82.6–102.9)
MONOCYTES # BLD: 8 % (ref 3–12)
NRBC AUTOMATED: 0 PER 100 WBC
PDW BLD-RTO: 14.1 % (ref 11.8–14.4)
PLATELET # BLD: 157 K/UL (ref 138–453)
PMV BLD AUTO: 10.1 FL (ref 8.1–13.5)
POTASSIUM SERPL-SCNC: 3.7 MMOL/L (ref 3.7–5.3)
RBC # BLD: 4.35 M/UL (ref 4.21–5.77)
SEG NEUTROPHILS: 73 % (ref 36–65)
SEGMENTED NEUTROPHILS ABSOLUTE COUNT: 6.49 K/UL (ref 1.5–8.1)
SODIUM BLD-SCNC: 129 MMOL/L (ref 135–144)
SPECIMEN DESCRIPTION: NORMAL
WBC # BLD: 8.9 K/UL (ref 3.5–11.3)

## 2022-12-31 PROCEDURE — 36415 COLL VENOUS BLD VENIPUNCTURE: CPT

## 2022-12-31 PROCEDURE — 80048 BASIC METABOLIC PNL TOTAL CA: CPT

## 2022-12-31 PROCEDURE — 73030 X-RAY EXAM OF SHOULDER: CPT

## 2022-12-31 PROCEDURE — 85025 COMPLETE CBC W/AUTO DIFF WBC: CPT

## 2022-12-31 PROCEDURE — 99222 1ST HOSP IP/OBS MODERATE 55: CPT | Performed by: INTERNAL MEDICINE

## 2022-12-31 PROCEDURE — 2580000003 HC RX 258: Performed by: STUDENT IN AN ORGANIZED HEALTH CARE EDUCATION/TRAINING PROGRAM

## 2022-12-31 PROCEDURE — 72128 CT CHEST SPINE W/O DYE: CPT

## 2022-12-31 PROCEDURE — 6370000000 HC RX 637 (ALT 250 FOR IP)

## 2022-12-31 PROCEDURE — 6370000000 HC RX 637 (ALT 250 FOR IP): Performed by: INTERNAL MEDICINE

## 2022-12-31 PROCEDURE — 6360000002 HC RX W HCPCS: Performed by: STUDENT IN AN ORGANIZED HEALTH CARE EDUCATION/TRAINING PROGRAM

## 2022-12-31 PROCEDURE — 72131 CT LUMBAR SPINE W/O DYE: CPT

## 2022-12-31 PROCEDURE — 6370000000 HC RX 637 (ALT 250 FOR IP): Performed by: STUDENT IN AN ORGANIZED HEALTH CARE EDUCATION/TRAINING PROGRAM

## 2022-12-31 RX ORDER — MORPHINE SULFATE 15 MG/1
15 TABLET, FILM COATED, EXTENDED RELEASE ORAL EVERY 12 HOURS SCHEDULED
Status: DISCONTINUED | OUTPATIENT
Start: 2022-12-31 | End: 2022-12-31 | Stop reason: HOSPADM

## 2022-12-31 RX ORDER — TAMSULOSIN HYDROCHLORIDE 0.4 MG/1
0.4 CAPSULE ORAL DAILY
Qty: 14 CAPSULE | Refills: 0 | Status: SHIPPED | OUTPATIENT
Start: 2022-12-31 | End: 2023-01-02 | Stop reason: SDUPTHER

## 2022-12-31 RX ORDER — CEFDINIR 300 MG/1
300 CAPSULE ORAL 2 TIMES DAILY
Qty: 10 CAPSULE | Refills: 0 | Status: SHIPPED | OUTPATIENT
Start: 2022-12-31 | End: 2023-01-02 | Stop reason: SDUPTHER

## 2022-12-31 RX ORDER — LOSARTAN POTASSIUM 50 MG/1
50 TABLET ORAL DAILY
Status: DISCONTINUED | OUTPATIENT
Start: 2022-12-31 | End: 2022-12-31 | Stop reason: HOSPADM

## 2022-12-31 RX ORDER — LOSARTAN POTASSIUM 50 MG/1
50 TABLET ORAL DAILY
Qty: 14 TABLET | Refills: 0 | Status: SHIPPED | OUTPATIENT
Start: 2023-01-01 | End: 2023-01-02 | Stop reason: SDUPTHER

## 2022-12-31 RX ORDER — AMLODIPINE BESYLATE 5 MG/1
10 TABLET ORAL DAILY
Qty: 60 TABLET | Refills: 0 | Status: SHIPPED | OUTPATIENT
Start: 2022-12-31 | End: 2023-01-02 | Stop reason: SDUPTHER

## 2022-12-31 RX ORDER — LANOLIN ALCOHOL/MO/W.PET/CERES
3 CREAM (GRAM) TOPICAL ONCE
Status: COMPLETED | OUTPATIENT
Start: 2022-12-31 | End: 2022-12-31

## 2022-12-31 RX ORDER — MIRTAZAPINE 7.5 MG/1
7.5 TABLET, FILM COATED ORAL NIGHTLY
Qty: 30 TABLET | Refills: 3 | Status: SHIPPED | OUTPATIENT
Start: 2022-12-31 | End: 2023-01-02 | Stop reason: SDUPTHER

## 2022-12-31 RX ORDER — CILOSTAZOL 100 MG/1
50 TABLET ORAL 2 TIMES DAILY
Status: DISCONTINUED | OUTPATIENT
Start: 2022-12-31 | End: 2022-12-31 | Stop reason: HOSPADM

## 2022-12-31 RX ORDER — OXYCODONE HYDROCHLORIDE 5 MG/1
15 TABLET ORAL 2 TIMES DAILY
Status: DISCONTINUED | OUTPATIENT
Start: 2022-12-31 | End: 2022-12-31 | Stop reason: HOSPADM

## 2022-12-31 RX ORDER — SODIUM CHLORIDE 9 MG/ML
INJECTION, SOLUTION INTRAVENOUS CONTINUOUS
Status: DISCONTINUED | OUTPATIENT
Start: 2022-12-31 | End: 2022-12-31

## 2022-12-31 RX ORDER — LEVOTHYROXINE SODIUM 0.03 MG/1
25 TABLET ORAL DAILY
Status: DISCONTINUED | OUTPATIENT
Start: 2023-01-01 | End: 2022-12-31 | Stop reason: HOSPADM

## 2022-12-31 RX ORDER — DULOXETIN HYDROCHLORIDE 20 MG/1
40 CAPSULE, DELAYED RELEASE ORAL DAILY
Status: DISCONTINUED | OUTPATIENT
Start: 2022-12-31 | End: 2022-12-31 | Stop reason: HOSPADM

## 2022-12-31 RX ORDER — DULOXETINE 40 MG/1
40 CAPSULE, DELAYED RELEASE ORAL DAILY
Qty: 30 CAPSULE | Refills: 3 | Status: SHIPPED | OUTPATIENT
Start: 2023-01-01 | End: 2023-01-02 | Stop reason: SDUPTHER

## 2022-12-31 RX ORDER — OXYCODONE HYDROCHLORIDE 5 MG/1
15 TABLET ORAL 2 TIMES DAILY
Status: DISCONTINUED | OUTPATIENT
Start: 2022-12-31 | End: 2022-12-31

## 2022-12-31 RX ORDER — MORPHINE SULFATE 15 MG/1
15 TABLET ORAL 2 TIMES DAILY
Status: DISCONTINUED | OUTPATIENT
Start: 2022-12-31 | End: 2022-12-31

## 2022-12-31 RX ORDER — MIRTAZAPINE 15 MG/1
7.5 TABLET, FILM COATED ORAL NIGHTLY
Status: DISCONTINUED | OUTPATIENT
Start: 2022-12-31 | End: 2022-12-31 | Stop reason: HOSPADM

## 2022-12-31 RX ADMIN — OXYCODONE 15 MG: 5 TABLET ORAL at 08:46

## 2022-12-31 RX ADMIN — LOSARTAN POTASSIUM 50 MG: 50 TABLET, FILM COATED ORAL at 11:13

## 2022-12-31 RX ADMIN — SODIUM CHLORIDE, PRESERVATIVE FREE 10 ML: 5 INJECTION INTRAVENOUS at 07:39

## 2022-12-31 RX ADMIN — ENOXAPARIN SODIUM 40 MG: 100 INJECTION SUBCUTANEOUS at 08:46

## 2022-12-31 RX ADMIN — CEFTRIAXONE SODIUM 1000 MG: 10 INJECTION, POWDER, FOR SOLUTION INTRAVENOUS at 06:12

## 2022-12-31 RX ADMIN — Medication 3 MG: at 01:47

## 2022-12-31 RX ADMIN — MORPHINE SULFATE 15 MG: 15 TABLET ORAL at 07:38

## 2022-12-31 RX ADMIN — SODIUM CHLORIDE: 9 INJECTION, SOLUTION INTRAVENOUS at 06:12

## 2022-12-31 RX ADMIN — DULOXETINE 40 MG: 20 CAPSULE, DELAYED RELEASE ORAL at 13:25

## 2022-12-31 RX ADMIN — Medication 81 MG: at 07:39

## 2022-12-31 RX ADMIN — LEVOTHYROXINE SODIUM 100 MCG: 100 TABLET ORAL at 07:25

## 2022-12-31 RX ADMIN — OXYCODONE 15 MG: 5 TABLET ORAL at 17:13

## 2022-12-31 RX ADMIN — CILOSTAZOL 50 MG: 100 TABLET ORAL at 11:14

## 2022-12-31 SDOH — ECONOMIC STABILITY: TRANSPORTATION INSECURITY
IN THE PAST 12 MONTHS, HAS THE LACK OF TRANSPORTATION KEPT YOU FROM MEDICAL APPOINTMENTS OR FROM GETTING MEDICATIONS?: NO

## 2022-12-31 SDOH — SOCIAL STABILITY: SOCIAL NETWORK: HOW OFTEN DO YOU ATTENT MEETINGS OF THE CLUB OR ORGANIZATION YOU BELONG TO?: NEVER

## 2022-12-31 SDOH — SOCIAL STABILITY: SOCIAL NETWORK: ARE YOU MARRIED, WIDOWED, DIVORCED, SEPARATED, NEVER MARRIED, OR LIVING WITH A PARTNER?: MARRIED

## 2022-12-31 SDOH — HEALTH STABILITY: MENTAL HEALTH: HOW OFTEN DO YOU HAVE A DRINK CONTAINING ALCOHOL?: NEVER

## 2022-12-31 SDOH — SOCIAL STABILITY: SOCIAL INSECURITY
WITHIN THE LAST YEAR, HAVE TO BEEN RAPED OR FORCED TO HAVE ANY KIND OF SEXUAL ACTIVITY BY YOUR PARTNER OR EX-PARTNER?: NO

## 2022-12-31 SDOH — HEALTH STABILITY: PHYSICAL HEALTH: ON AVERAGE, HOW MANY MINUTES DO YOU ENGAGE IN EXERCISE AT THIS LEVEL?: 0 MIN

## 2022-12-31 SDOH — SOCIAL STABILITY: SOCIAL NETWORK: HOW OFTEN DO YOU ATTEND CHURCH OR RELIGIOUS SERVICES?: NEVER

## 2022-12-31 SDOH — ECONOMIC STABILITY: FOOD INSECURITY: WITHIN THE PAST 12 MONTHS, YOU WORRIED THAT YOUR FOOD WOULD RUN OUT BEFORE YOU GOT MONEY TO BUY MORE.: NEVER TRUE

## 2022-12-31 SDOH — ECONOMIC STABILITY: FOOD INSECURITY: WITHIN THE PAST 12 MONTHS, THE FOOD YOU BOUGHT JUST DIDN'T LAST AND YOU DIDN'T HAVE MONEY TO GET MORE.: NEVER TRUE

## 2022-12-31 SDOH — ECONOMIC STABILITY: HOUSING INSECURITY: IN THE LAST 12 MONTHS, HOW MANY PLACES HAVE YOU LIVED?: 1

## 2022-12-31 SDOH — SOCIAL STABILITY: SOCIAL NETWORK
DO YOU BELONG TO ANY CLUBS OR ORGANIZATIONS SUCH AS CHURCH GROUPS UNIONS, FRATERNAL OR ATHLETIC GROUPS, OR SCHOOL GROUPS?: NO

## 2022-12-31 SDOH — ECONOMIC STABILITY: INCOME INSECURITY: IN THE LAST 12 MONTHS, WAS THERE A TIME WHEN YOU WERE NOT ABLE TO PAY THE MORTGAGE OR RENT ON TIME?: NO

## 2022-12-31 SDOH — HEALTH STABILITY: PHYSICAL HEALTH: ON AVERAGE, HOW MANY DAYS PER WEEK DO YOU ENGAGE IN MODERATE TO STRENUOUS EXERCISE (LIKE A BRISK WALK)?: 0 DAYS

## 2022-12-31 SDOH — SOCIAL STABILITY: SOCIAL INSECURITY
WITHIN THE LAST YEAR, HAVE YOU BEEN KICKED, HIT, SLAPPED, OR OTHERWISE PHYSICALLY HURT BY YOUR PARTNER OR EX-PARTNER?: NO

## 2022-12-31 SDOH — SOCIAL STABILITY: SOCIAL INSECURITY: WITHIN THE LAST YEAR, HAVE YOU BEEN AFRAID OF YOUR PARTNER OR EX-PARTNER?: NO

## 2022-12-31 SDOH — ECONOMIC STABILITY: TRANSPORTATION INSECURITY
IN THE PAST 12 MONTHS, HAS LACK OF TRANSPORTATION KEPT YOU FROM MEETINGS, WORK, OR FROM GETTING THINGS NEEDED FOR DAILY LIVING?: NO

## 2022-12-31 SDOH — SOCIAL STABILITY: SOCIAL NETWORK: IN A TYPICAL WEEK, HOW MANY TIMES DO YOU TALK ON THE PHONE WITH FAMILY, FRIENDS, OR NEIGHBORS?: TWICE A WEEK

## 2022-12-31 SDOH — HEALTH STABILITY: MENTAL HEALTH
STRESS IS WHEN SOMEONE FEELS TENSE, NERVOUS, ANXIOUS, OR CAN'T SLEEP AT NIGHT BECAUSE THEIR MIND IS TROUBLED. HOW STRESSED ARE YOU?: NOT AT ALL

## 2022-12-31 SDOH — SOCIAL STABILITY: SOCIAL NETWORK: HOW OFTEN DO YOU GET TOGETHER WITH FRIENDS OR RELATIVES?: ONCE A WEEK

## 2022-12-31 SDOH — ECONOMIC STABILITY: INCOME INSECURITY: HOW HARD IS IT FOR YOU TO PAY FOR THE VERY BASICS LIKE FOOD, HOUSING, MEDICAL CARE, AND HEATING?: NOT HARD AT ALL

## 2022-12-31 SDOH — HEALTH STABILITY: MENTAL HEALTH: HOW MANY STANDARD DRINKS CONTAINING ALCOHOL DO YOU HAVE ON A TYPICAL DAY?: PATIENT DOES NOT DRINK

## 2022-12-31 SDOH — ECONOMIC STABILITY: HOUSING INSECURITY
IN THE LAST 12 MONTHS, WAS THERE A TIME WHEN YOU DID NOT HAVE A STEADY PLACE TO SLEEP OR SLEPT IN A SHELTER (INCLUDING NOW)?: NO

## 2022-12-31 SDOH — SOCIAL STABILITY: SOCIAL INSECURITY: WITHIN THE LAST YEAR, HAVE YOU BEEN HUMILIATED OR EMOTIONALLY ABUSED IN OTHER WAYS BY YOUR PARTNER OR EX-PARTNER?: NO

## 2022-12-31 ASSESSMENT — PAIN - FUNCTIONAL ASSESSMENT
PAIN_FUNCTIONAL_ASSESSMENT: PREVENTS OR INTERFERES SOME ACTIVE ACTIVITIES AND ADLS
PAIN_FUNCTIONAL_ASSESSMENT: PREVENTS OR INTERFERES SOME ACTIVE ACTIVITIES AND ADLS

## 2022-12-31 ASSESSMENT — PAIN SCALES - GENERAL
PAINLEVEL_OUTOF10: 7
PAINLEVEL_OUTOF10: 7

## 2022-12-31 ASSESSMENT — PAIN DESCRIPTION - ORIENTATION
ORIENTATION: LOWER
ORIENTATION: LOWER

## 2022-12-31 ASSESSMENT — PAIN DESCRIPTION - DESCRIPTORS: DESCRIPTORS: ACHING

## 2022-12-31 ASSESSMENT — PAIN DESCRIPTION - LOCATION
LOCATION: BACK
LOCATION: BACK

## 2022-12-31 NOTE — H&P
[address]     Department of Internal Medicine - Staff Internal Medicine Teaching Service          ADMISSION NOTE/HISTORY AND PHYSICAL EXAMINATION   Date: 12/30/2022  Patient Name: Selina Vazquez  Date of admission: 12/30/2022  2:40 PM  YOB: 1940  PCP: Kentrell Dee MD  History Obtained From:  patient, electronic medical record    CHIEF COMPLAINT     Chief complaint: AMS    HISTORY OF PRESENTING ILLNESS     The patient is a pleasant 79 y.o. male with a past medical history significant for dementia secondary to Alzheimer disease, diabetes mellitus, hypertension, PAD, HLD, hypothyroidism. Presented extremity because of concerns of hallucinations. Family reported seeing the patient talking to someone else who was not there. Patient is still have fixed hallucinations. Denied any history of fever, chills, diarrhea or vomiting. Patient denied any changes in the medications or any changes in the sleeping cycle. Denies any changes in urine frequency or symptoms with urination. Denied any weakness or dizziness. Patient reported a fall 1 week ago on some bruises on his left hand. He did not hit his head and did not lose consciousness and it was only because of coordination issues as per patient. In the ED patient blood pressure was in 200s SBP. Troponin were elevated to 25/25. Liver enzymes were normal and BMP was normal.  Mild central and cortical cerebral atrophy. Mild chronic deep white matter ischemic changes No acute intracranial abnormalities are noted. . Renal ultrasound was almost within normal limits.       Review of Systems:  General ROS: Completed and except as mentioned above were negative   HEENT ROS: Completed and except as mentioned above were negative   Allergy and Immunology ROS:  Completed and except as mentioned above were negative  Hematological and Lymphatic ROS:  Completed and except as mentioned above were negative  Respiratory ROS:  Completed and except as mentioned above were negative  Cardiovascular ROS:  Completed and except as mentioned above were negative  Gastrointestinal ROS: Completed and except as mentioned above were negative  Genito-Urinary ROS:  Completed and except as mentioned above were negative  Musculoskeletal ROS:  Completed and except as mentioned above were negative  Neurological ROS:  Completed and except as mentioned above were negative  Skin & Dermatological ROS:  Completed and except as mentioned above were negative  Psychological ROS:  Completed and except as mentioned above were negative    PAST MEDICAL HISTORY     History reviewed. No pertinent past medical history. PAST SURGICAL HISTORY     History reviewed. No pertinent surgical history. ALLERGIES     Patient has no known allergies. MEDICATIONS PRIOR TO ADMISSION     Prior to Admission medications    Medication Sig Start Date End Date Taking? Authorizing Provider   gabapentin (NEURONTIN) 600 MG tablet Take 600 mg by mouth 2 times daily. 1 tab in the morning, 2 at bedtime   Yes Historical Provider, MD   metFORMIN (GLUCOPHAGE) 500 MG tablet Take 1,000 mg by mouth 2 times daily (with meals)   Yes Historical Provider, MD   levothyroxine (SYNTHROID) 100 MCG tablet Take 100 mcg by mouth Daily . 025mg one daily   Yes Historical Provider, MD   DULoxetine (CYMBALTA) 60 MG extended release capsule Take 60 mg by mouth nightly   Yes Historical Provider, MD   folic acid (FOLVITE) 1 MG tablet Take 1 mg by mouth daily   Yes Historical Provider, MD   atenolol (TENORMIN) 50 MG tablet Take 50 mg by mouth nightly   Yes Historical Provider, MD   atorvastatin (LIPITOR) 40 MG tablet Take 40 mg by mouth daily   Yes Historical Provider, MD   oxyCODONE (OXY-IR) 15 MG immediate release tablet Take 15 mg by mouth 2 times daily. Yes Historical Provider, MD   morphine (MSIR) 15 MG tablet Take 15 mg by mouth 2 times daily.    Yes Historical Provider, MD   aspirin 81 MG EC tablet Take 81 mg by mouth daily   Yes Historical Provider, MD       SOCIAL HISTORY     Tobacco: Current smoker  Alcohol: Not available  Illicits: Denied    FAMILY HISTORY     History reviewed. No pertinent family history. PHYSICAL EXAM     Vitals: BP (!) 188/91   Pulse 57   Temp 98.5 °F (36.9 °C) (Oral)   Resp 12   Ht 5' 7\" (1.702 m)   Wt 145 lb (65.8 kg)   SpO2 94%   BMI 22.71 kg/m²   Tmax: Temp (24hrs), Av.5 °F (36.9 °C), Min:98.5 °F (36.9 °C), Max:98.5 °F (36.9 °C)    Last Body weight:   Wt Readings from Last 3 Encounters:   22 145 lb (65.8 kg)     Body Mass Index : Body mass index is 22.71 kg/m². PHYSICAL EXAMINATION:  Constitutional: This is a well developed, well nourished, 18.5-24.9 - Normal 79y.o. year old male who is alert, oriented, cooperative and in no apparent distress. Head:normocephalic and atraumatic. EENT:  PERRLA. No conjunctival injections. Septum was midline, mucosa was without erythema, exudates or cobblestoning. No thrush was noted. Neck: Supple without thyromegaly. No elevated JVP. Trachea was midline. Respiratory: Chest was symmetrical without dullness to percussion. Breath sounds bilaterally were clear to auscultation. There were no wheezes, rhonchi or rales. There is no intercostal retraction or use of accessory muscles. No egophony noted. Cardiovascular: Regular without murmur, clicks, gallops or rubs. Abdomen: Slightly rounded and soft without organomegaly. No rebound, rigidity or guarding was appreciated. Lymphatic: No lymphadenopathy. Musculoskeletal: Normal curvature of the spine. No gross muscle weakness. Extremities:  No lower extremity edema, ulcerations, tenderness, varicosities or erythema. Muscle size, tone and strength are normal.  No involuntary movements are noted. Skin:  Warm and dry. Good color, turgor and pigmentation. No lesions or scars.   No cyanosis or clubbing  Neurological/Psychiatric: The patient's general behavior, level of consciousness, thought content and emotional status is normal.          INVESTIGATIONS     Laboratory Testing:     Recent Results (from the past 24 hour(s))   POC Glucose Fingerstick    Collection Time: 12/30/22  2:50 PM   Result Value Ref Range    POC Glucose 148 (H) 75 - 110 mg/dL   CBC with Auto Differential    Collection Time: 12/30/22  3:27 PM   Result Value Ref Range    WBC 5.1 3.5 - 11.3 k/uL    RBC 4.38 4.21 - 5.77 m/uL    Hemoglobin 14.2 13.0 - 17.0 g/dL    Hematocrit 42.9 40.7 - 50.3 %    MCV 97.9 82.6 - 102.9 fL    MCH 32.4 25.2 - 33.5 pg    MCHC 33.1 28.4 - 34.8 g/dL    RDW 14.4 11.8 - 14.4 %    Platelets 424 703 - 928 k/uL    MPV 10.2 8.1 - 13.5 fL    NRBC Automated 0.0 0.0 per 100 WBC    Seg Neutrophils 61 36 - 65 %    Lymphocytes 27 24 - 43 %    Monocytes 9 3 - 12 %    Eosinophils % 2 1 - 4 %    Basophils 1 0 - 2 %    Immature Granulocytes 0 0 %    Segs Absolute 3.09 1.50 - 8.10 k/uL    Absolute Lymph # 1.37 1.10 - 3.70 k/uL    Absolute Mono # 0.46 0.10 - 1.20 k/uL    Absolute Eos # 0.11 0.00 - 0.44 k/uL    Basophils Absolute 0.04 0.00 - 0.20 k/uL    Absolute Immature Granulocyte <0.03 0.00 - 0.30 k/uL   BMP    Collection Time: 12/30/22  3:27 PM   Result Value Ref Range    Glucose 158 (H) 70 - 99 mg/dL    BUN 15 8 - 23 mg/dL    Creatinine 1.02 0.70 - 1.20 mg/dL    Est, Glom Filt Rate >60 >60 mL/min/1.73m2    Calcium 8.9 8.6 - 10.4 mg/dL    Sodium 137 135 - 144 mmol/L    Potassium 4.5 3.7 - 5.3 mmol/L    Chloride 101 98 - 107 mmol/L    CO2 25 20 - 31 mmol/L    Anion Gap 11 9 - 17 mmol/L   Hepatic Function Panel    Collection Time: 12/30/22  3:27 PM   Result Value Ref Range    Albumin 3.9 3.5 - 5.2 g/dL    Alkaline Phosphatase 73 40 - 129 U/L    ALT 10 5 - 41 U/L    AST 17 <40 U/L    Total Bilirubin 0.3 0.3 - 1.2 mg/dL    Bilirubin, Direct 0.1 <0.3 mg/dL    Bilirubin, Indirect 0.2 0.0 - 1.0 mg/dL    Total Protein 6.5 6.4 - 8.3 g/dL    Albumin/Globulin Ratio 1.5 1.0 - 2.5   TSH with Reflex    Collection Time: 12/30/22  3:27 PM   Result Value Ref Range    TSH 2.85 0.30 - 5.00 uIU/mL   BLOOD GAS, VENOUS    Collection Time: 12/30/22  3:27 PM   Result Value Ref Range    pH, Coy 7.314 (L) 7.320 - 7.420    pCO2, Coy 50.5 39 - 55 mm Hg    pO2, Coy 34.5 30 - 50 mm Hg    HCO3, Venous 24.9 24 - 30 mmol/L    Negative Base Excess, Coy 1.4 0.0 - 2.0 mmol/L    O2 Sat, Coy 64.9 60.0 - 85.0 %    Carboxyhemoglobin 5.3 (H) 0 - 5 %    Pt Temp 37.0     FIO2 INFORMATION NOT PROVIDED    Troponin    Collection Time: 12/30/22  3:27 PM   Result Value Ref Range    Troponin, High Sensitivity 25 (H) 0 - 22 ng/L   Ammonia    Collection Time: 12/30/22  3:27 PM   Result Value Ref Range    Ammonia 16 16 - 60 umol/L   Urinalysis with Reflex to Culture    Collection Time: 12/30/22  4:51 PM    Specimen: Urine   Result Value Ref Range    Color, UA Yellow Yellow    Turbidity UA Clear Clear    Glucose, Ur NEGATIVE NEGATIVE    Bilirubin Urine NEGATIVE NEGATIVE    Ketones, Urine NEGATIVE NEGATIVE    Specific Gravity, UA 1.018 1.005 - 1.030    Urine Hgb NEGATIVE NEGATIVE    pH, UA 6.0 5.0 - 8.0    Protein, UA 2+ (A) NEGATIVE    Urobilinogen, Urine Normal Normal    Nitrite, Urine NEGATIVE NEGATIVE    Leukocyte Esterase, Urine NEGATIVE NEGATIVE   Microscopic Urinalysis    Collection Time: 12/30/22  4:51 PM   Result Value Ref Range    WBC, UA 2 TO 5 0 - 5 /HPF    RBC, UA 0 TO 2 0 - 2 /HPF    Casts UA 20 TO 50 0 - 2 /LPF    Casts UA HYALINE 0 - 2 /LPF    Crystals, UA CALCIUM OXALATE (A) None /HPF    Crystals, UA MANY (A) None /HPF    Epithelial Cells UA 0 TO 2 0 - 5 /HPF    Mucus, UA 1+ (A) None   Troponin    Collection Time: 12/30/22  5:14 PM   Result Value Ref Range    Troponin, High Sensitivity 25 (H) 0 - 22 ng/L       Imaging:   CT HEAD WO CONTRAST    Result Date: 12/30/2022  Mild central and cortical cerebral atrophy. Mild chronic deep white matter ischemic changes No acute intracranial abnormalities are noted.  .       ASSESSMENT & PLAN         Principal Problem:  Hypertensive emergency  -Started on Cardene drip  -Systolic blood pressure on admission was in 200s. -Resume home medications after stabilizing BP    Acute metabolic encephalopathy  -Might be due to hypertensive emergency  -We will check blood culture, urine culture  -Ammonia level WNL  -We will check CT lumbar and thoracic spine after history of fall    Essential hypertension  -Resume home medications once med rec done and BP stabilized    Chronic coronary artery disease  -On aspirin    Hypothyroidism  -We will continue thyroxine 100 mg  -We will check TSH    Alzheimer's dementia   - On Namenda 5 mg BID  - Limit use of pain medication as tolerated. - Fall precautions    Type 2 diabetes mellitus   -Patient reported using insulin Lantus at home  -Started patient on sliding scale  -Hypoglycemia protocol initiated    PAD (peripheral artery disease) (CMS-[facility])  Aspirin    Mixed hyperlipidemia  -Continue Lipitor 40 mg    Tobacco use           DVT ppx: Lovenox  GI ppx: Not indicated    PT/OT: Consulted  Discharge Planning:  consulted    Jadine Bence, MD  Internal Medicine Resident, PGY-1  [address], [place]  12/30/2022, 7:11 PM

## 2022-12-31 NOTE — PROGRESS NOTES
SENIOR NOTE:  -79-year-old male with PMH significant for bilateral PAD, osteomyelitis of right foot, Alzheimer's dementia, diabetes with peripheral neuropathy, CAD x remote history of 1 stent, hypertension, chronic back pain on opioids, smoker was brought to the ER by family with concern of altered mental status; as per family patient occasionally has visual hallucinations with subsites of spontaneously but he has been hallucinating people in his house all day today. On arrival in the ER patient was afebrile, hypertensive with /63, heart rate 58, saturating well on room air. On exam, patient is alert oriented x3 but is unable to fully disclose his home address could be a symptom of longstanding dementia. He denied any diarrhea, cough with phlegm production, dysuria, wounds. He admitted to difficulty urinating with weak stream and as per wife had a fall on his left side few days ago but unsure if he hit his head. Lab significant for elevated troponin 25>> 25, EKG showed NSR, UA positive for calcium oxalate crystals but negative for nitrite, leukocyte Esterase, 2-5 WBC. CT head showed mild central and cortical cerebral atrophy with chronic deep white matter ischemic changes. Assessment and plan:  Acute toxic/metabolic encephalopathy:  -Possibly due to #2  -Follow-up on urine culture, blood cultures. Stop Rocephin if cultures negative  -Renal ultrasound ruled out obstructive uropathy; patient did have 394 mL on bladder scan and was straight cathed x 1. Hypertensive urgency:  -Started on IV nifedipine with goal , currently weaned off of nifedipine gtt   Recent fall:  -X-ray left shoulder  -CT thoracic and lumbar spine; patient appears to have nerve stimulator for back pain.  -On atenolol 50 mg nightly at home, however heart rate low 50s; will require gradual restarting to prevent withdrawal.    Elevated troponins:  -Could be secondary to demand ischemia, follow-up on echo.   Patient does have a history of hyperlipidemia, active smoker, hypertension, DM type II and remote history of 1 cardiac stent.      Diabetes mellitus type 2:  -Continue to monitor blood glucose.    Hypothyroidism:  -Synthroid 100 mcg daily    Chronic back pain on opioids:  -On morphine 50 mg twice daily, oxycodone 30 mg twice daily at home  -Restart as tolerated    DVT prophylaxis: Lovenox  GI prophylaxis: Not required    Rommel Burton  PGY-3  Internal Medicine  [place], [place]  12/31/2022 5:39 AM

## 2022-12-31 NOTE — ED NOTES
Perfect served resident about the Bp being 206/64. Awaiting orders.      Ananya Smith, RN  12/30/22 [id number]

## 2022-12-31 NOTE — PROGRESS NOTES
Spoke with attending regarding blood pressure. Ordered okay to go home, will start anti-hypertensive tomorrow.

## 2022-12-31 NOTE — CARE COORDINATION
Case Management Assessment  Initial Evaluation    Date/Time of Evaluation: 12/31/2022 1:06 PM  Assessment Completed by: Nestor Hernandez RN    If patient is discharged prior to next notation, then this note serves as note for discharge by case management. Patient Name: Juve Whitehead                   YOB: 1940  Diagnosis: Confusion [R41.0]  Acute encephalopathy [G93.40]  Altered mental status, unspecified altered mental status type [R41.82]                   Date / Time: 12/30/2022  2:40 PM    Patient Admission Status: Inpatient   Readmission Risk (Low < 19, Mod (19-27), High > 27): Readmission Risk Score: 10.9    Current PCP: Britta Quan MD  PCP verified by CM? (P) Yes    Chart Reviewed: Yes      History Provided by: (P) Patient  Patient Orientation: (P) Alert and Oriented    Patient Cognition: (P) Alert    Hospitalization in the last 30 days (Readmission):  No    If yes, Readmission Assessment in CM Navigator will be completed.     Advance Directives:      Code Status: Full Code   Patient's Primary Decision Maker is: Legal Next of Kin      Discharge Planning:    Patient lives with: (P) Spouse/Significant Other Type of Home: (P) House  Primary Care Giver: (P) Self  Patient Support Systems include: (P) Spouse/Significant Other, Children, Family Members   Current Financial resources: (P) Medicare  Current community resources:    Current services prior to admission: (P) None            Current DME:              Type of Home Care services:  (P) None    ADLS  Prior functional level: (P) Independent in ADLs/IADLs  Current functional level: (P) Independent in ADLs/IADLs    PT AM-PAC:   /24  OT AM-PAC:   /24    Family can provide assistance at DC: (P) Yes  Would you like Case Management to discuss the discharge plan with any other family members/significant others, and if so, who? (P) Yes (wife)  Plans to Return to Present Housing: (P) Yes  Other Identified Issues/Barriers to RETURNING to current housing: none  Potential Assistance needed at discharge: (P) N/A            Potential DME:    Patient expects to discharge to: (P) [facility] for transportation at discharge:      Financial    Payor: Kenneth Isaacs / Plan: Antoinette Devlin PPO / Product Type: Medicare /     Does insurance require precert for SNF: Yes    Potential assistance Purchasing Medications: No  Meds-to-Beds request:  yes    No Pharmacies Listed    Notes:    Factors facilitating achievement of predicted outcomes: Family support, Cooperative, and Pleasant    Barriers to discharge: Medical complications    Additional Case Management Notes: patient returning home independently with his wife. He denies needs and has transportation    The Plan for Transition of Care is related to the following treatment goals of Confusion [R41.0]  Acute encephalopathy [G93.40]  Altered mental status, unspecified altered mental status type [H94.68]    IF APPLICABLE: The Patient and/or patient representative Candelario Favre and his family were provided with a choice of provider and agrees with the discharge plan. Freedom of choice list with basic dialogue that supports the patient's individualized plan of care/goals and shares the quality data associated with the providers was provided to: (P) Patient   Patient Representative Name:       The Patient and/or Patient Representative Agree with the Discharge Plan?  (P) Yes    Lorenzo Velasco RN  Case Management Department  Ph: [phone] Fax: [phone]

## 2022-12-31 NOTE — PROGRESS NOTES
[facility]  Internal Medicine Teaching Residency Program  Inpatient Daily Progress Note  ______________________________________________________________________________    Patient: Elia Cat  YOB: 1940   PGJ:[id number]    Acct: [de-identified]     Room: 2019/2019-01  Admit date: 12/30/2022  Today's date: 12/31/22  Number of days in the hospital: 1    SUBJECTIVE   Admitting Diagnosis: Altered mental status, unspecified altered mental status type  CC: AMS  Pt examined at bedside. Chart & results reviewed. Hemodynamically stable and afebrile  No acute overnight events    ROS:  Constitutional:  negative for chills, fevers, sweats  Respiratory:  negative for cough, dyspnea on exertion, hemoptysis, shortness of breath, wheezing  Cardiovascular:  negative for chest pain, chest pressure/discomfort, lower extremity edema, palpitations  Gastrointestinal:  negative for abdominal pain, constipation, diarrhea, nausea, vomiting  Neurological:  negative for dizziness, headache  BRIEF HISTORY     The patient is a pleasant 79 y.o. male with a past medical history significant for dementia secondary to Alzheimer disease, diabetes mellitus, hypertension, PAD, HLD, hypothyroidism. Presented extremity because of concerns of hallucinations. Family reported seeing the patient talking to someone else who was not there. Patient is still have fixed hallucinations. Denied any history of fever, chills, diarrhea or vomiting. Patient denied any changes in the medications or any changes in the sleeping cycle. Denies any changes in urine frequency or symptoms with urination. Denied any weakness or dizziness. Patient reported a fall 1 week ago on some bruises on his left hand. He did not hit his head and did not lose consciousness and it was only because of coordination issues as per patient. In the ED patient blood pressure was in 200s SBP.   Troponin were elevated to 25/25. Liver enzymes were normal and BMP was normal.  Mild central and cortical cerebral atrophy. Mild chronic deep white matter ischemic changes No acute intracranial abnormalities are noted. . Renal ultrasound was almost within normal limits. OBJECTIVE     Vital Signs:  BP (!) 172/82   Pulse 61   Temp 98.6 °F (37 °C) (Oral)   Resp 15   Ht 5' 7\" (1.702 m)   Wt 145 lb (65.8 kg)   SpO2 95%   BMI 22.71 kg/m²     Temp (24hrs), Av.3 °F (36.8 °C), Min:98 °F (36.7 °C), Max:98.6 °F (37 °C)    In: 10   Out: 1100 [Urine:1100]    Physical Exam:  Constitutional: This is a well developed, well nourished, 18.5-24.9 - Normal 79y.o. year old male who is alert, oriented, cooperative and in no apparent distress. Head:normocephalic and atraumatic. EENT:  PERRLA. No conjunctival injections. Septum was midline, mucosa was without erythema, exudates or cobblestoning. No thrush was noted. Neck: Supple without thyromegaly. No elevated JVP. Trachea was midline. Respiratory: Chest was symmetrical without dullness to percussion. Breath sounds bilaterally were clear to auscultation. There were no wheezes, rhonchi or rales. There is no intercostal retraction or use of accessory muscles. No egophony noted. Cardiovascular: Regular without murmur, clicks, gallops or rubs. Abdomen: Slightly rounded and soft without organomegaly. No rebound, rigidity or guarding was appreciated. Lymphatic: No lymphadenopathy. Musculoskeletal: Normal curvature of the spine. No gross muscle weakness. Extremities:  No lower extremity edema, ulcerations, tenderness, varicosities or erythema. Muscle size, tone and strength are normal.  No involuntary movements are noted. Skin:  Warm and dry. Good color, turgor and pigmentation. No lesions or scars.   No cyanosis or clubbing  Neurological/Psychiatric: The patient's general behavior, level of consciousness, thought content and emotional status is normal. Medications:  Scheduled Medications:    cefTRIAXone (ROCEPHIN) IV  1,000 mg IntraVENous Q24H    morphine  15 mg Oral BID    oxyCODONE  15 mg Oral BID    [Held by provider] atenolol  50 mg Oral Nightly    atorvastatin  40 mg Oral Daily    levothyroxine  100 mcg Oral Daily    aspirin  81 mg Oral Daily    sodium chloride flush  5-40 mL IntraVENous 2 times per day    enoxaparin  40 mg SubCUTAneous Daily    niCARdipine in sodium chloride         Continuous Infusions:    niCARdipine Stopped (12/31/22 0256)    sodium chloride 75 mL/hr at 12/31/22 0612    sodium chloride       PRN Medicationssodium chloride flush, 5-40 mL, PRN  sodium chloride, , PRN  ondansetron, 4 mg, Q8H PRN   Or  ondansetron, 4 mg, Q6H PRN  polyethylene glycol, 17 g, Daily PRN  acetaminophen, 650 mg, Q6H PRN   Or  acetaminophen, 650 mg, Q6H PRN        Diagnostic Labs:  CBC:   Recent Labs     12/30/22  1527   WBC 5.1   RBC 4.38   HGB 14.2   HCT 42.9   MCV 97.9   RDW 14.4        BMP:   Recent Labs     12/30/22  1527      K 4.5      CO2 25   BUN 15   CREATININE 1.02     BNP: No results for input(s): BNP in the last 72 hours. PT/INR: No results for input(s): PROTIME, INR in the last 72 hours. APTT: No results for input(s): APTT in the last 72 hours. CARDIAC ENZYMES: No results for input(s): CKMB, CKMBINDEX, TROPONINI in the last 72 hours. Invalid input(s): CKTOTAL;3  FASTING LIPID PANEL:No results found for: CHOL, HDL, TRIG  LIVER PROFILE:   Recent Labs     12/30/22  1527   AST 17   ALT 10   BILIDIR 0.1   BILITOT 0.3   ALKPHOS 68      MICROBIOLOGY:   Lab Results   Component Value Date/Time    CULTURE NO GROWTH <24 HRS 12/30/2022 07:44 PM       Imaging:    CT HEAD WO CONTRAST    Result Date: 12/30/2022  Mild central and cortical cerebral atrophy. Mild chronic deep white matter ischemic changes No acute intracranial abnormalities are noted. Ivy Reveles      US RENAL COMPLETE    Result Date: 12/30/2022  Unremarkable ultrasound of the kidneys and urinary bladder. RECOMMENDATIONS: 1. No hydronephrosis. 2. Small calcification adjacent to the right renal pelvis, which could be a ureteral stone or atherosclerotic calcification adjacent vessel. 3. Simple right renal cyst.  No further imaging follow-up is required. Unavailable       ASSESSMENT & PLAN     Principal Problem:  Hypertensive emergency  -Started on Cardene drip  -Systolic blood pressure on admission was in 200s. -Resume home medications after stabilizing BP     Acute metabolic encephalopathy  -Might be due to hypertensive emergency  -We will check blood culture, urine culture  -Ammonia level WNL  -We will check CT lumbar and thoracic spine after history of fall     Essential hypertension  -Resume home medications once med rec done and BP stabilized     Chronic coronary artery disease  -On aspirin     Hypothyroidism  -We will continue thyroxine 100 mg  -We will check TSH     Alzheimer's dementia   - On Namenda 5 mg BID  - Limit use of pain medication as tolerated. - Fall precautions    Type 2 diabetes mellitus   -Patient reported using insulin Lantus at home  -Started patient on sliding scale  -Hypoglycemia protocol initiated    PAD (peripheral artery disease) (CMS-[facility])  Aspirin    Mixed hyperlipidemia  -Continue Lipitor 40 mg     Tobacco use              DVT ppx: Lovenox  GI ppx: Not indicated     PT/OT: Consulted  Discharge Planning:  consulted    Pee Delgado MD  Internal Medicine Resident, PGY-1  [facility];  [place], [place]  12/31/2022, 7:41 AM

## 2022-12-31 NOTE — ED NOTES
Pt resting comfortably in no acute distress. Respirations even and unlabored. Call light remains within reach. No needs at this time.        Keisha Rocha RN  12/30/22 1521

## 2022-12-31 NOTE — DISCHARGE SUMMARY
[facility]  Office: [address], DO, Chris Sanchezne, DO, Ashlie Jennifer, DO, Albajoshuamazin Arvind Chiquita, DO, India Santamaria MD, Jose C Mcelroy MD, Bud Alexander MD, Pratima Corbin MD,  Mildred Giron MD, Glo Moritz, MD, José Miguel Koehler DO, Celestino Alberto MD,  Faviola Howell MD, James Llamas MD, Felix Garcia DO, Doyne Apgar, MD, Rosalva Strauss MD, Tim Perez DO, Salome Woods MD, Shara Cordoba MD, Carmen Zaragoza MD, Lisa Tsai MD, Luis M Dao DO, Jose Rodriguez MD, Helena Baldwin MD, Indy Begum, CNP,  Faith Gunn, CNP, Dorothy Wei, CNP, Alyce Alvarado, CNP,  Henry Worley, [facility], Maru Garnett, CNP, Colonel Madrid, CNP, Nola Freeman, CNP, Dede Blackwood, CNP, Katherine Higgins, CNP, Fiona De Los Santos PAJannaC, Randy Marcus, CNS, Ewa Shipley, CNP, Mónica Cowart, [facility]    Discharge Summary     Patient ID: Marya Bryant  :  1940   MRN: [id number]     ACCOUNT:  [de-identified]   Patient's PCP: Lucy Beth MD  Admit Date: 2022   Discharge Date: 2022     Length of Stay: 1  Code Status:  Full Code  Admitting Physician: José Miguel Koehler DO  Discharge Physician: José Miguel Koehler DO     Active Discharge Diagnoses:     Hospital Problem Lists:  Principal Problem:    Altered mental status, unspecified altered mental status type  Active Problems:    Acute encephalopathy    Confusion  Resolved Problems:    * No resolved hospital problems. *      Admission Condition:  good     Discharged Condition: good    Hospital Stay:     Hospital Course:  Marya Bryant is a 79 y.o. male who was admitted for the management of   Altered mental status, unspecified altered mental status type , presented to ER with Hallucinations    Initially patient presented with elevated blood pressures as well and was started on Cardene drip, blood pressure was labile eventually was weaned off.   Patient was restarted on some of his home medications but there were issues with discrepancies on home medication requisition. Patient was started on his home chronic pain medication regimen, atenolol was held due to bradycardia and restarted on Cozaar. Discussed that patient's altered mental status hallucinations likely from delirium, patient's family states he does not sleep well at home, usually naps throughout the day. They also admit to 20 pound weight loss over the course of a year. Discussed that we can initiate Remeron for weight gain, depression and sleep hygiene. Family agreeable to try low-dose, Cymbalta was decreased    Follow-up  Check blood pressures twice daily, started on Cozaar and Norvasc 5 mg daily, follow-up with primary care physician.   Keep log of blood pressures and bring to primary care physician office  Start Remeron 7.5 mg, if partial effect can consider increasing  Discussed blood pressure medications with primary care physician  Discussed weight loss with primary care physician  Patient has slightly elevated troponins, echo was deferred as patient not having chest pain or EKG changes, recommend outpatient stress testing if family desires  Discussed coordinating home medications with primary care physician      Significant therapeutic interventions: none    Significant Diagnostic Studies:   Labs / Micro:  CBC:   Lab Results   Component Value Date/Time    WBC 8.9 12/31/2022 07:30 AM    RBC 4.35 12/31/2022 07:30 AM    HGB 13.9 12/31/2022 07:30 AM    HCT 43.1 12/31/2022 07:30 AM    MCV 99.1 12/31/2022 07:30 AM    MCH 32.0 12/31/2022 07:30 AM    MCHC 32.3 12/31/2022 07:30 AM    RDW 14.1 12/31/2022 07:30 AM     12/31/2022 07:30 AM     BMP:    Lab Results   Component Value Date/Time    GLUCOSE 123 12/31/2022 07:30 AM     12/31/2022 07:30 AM    K 3.7 12/31/2022 07:30 AM    CL 98 12/31/2022 07:30 AM    CO2 24 12/31/2022 07:30 AM    ANIONGAP 7 12/31/2022 07:30 AM    BUN 12 12/31/2022 07:30 AM    CREATININE 0.79 12/31/2022 07:30 AM    CALCIUM 8.8 12/31/2022 07:30 AM    LABGLOM >60 12/31/2022 07:30 AM     HFP:    Lab Results   Component Value Date/Time    PROT 6.5 12/30/2022 03:27 PM     CMP:    Lab Results   Component Value Date/Time    GLUCOSE 123 12/31/2022 07:30 AM     12/31/2022 07:30 AM    K 3.7 12/31/2022 07:30 AM    CL 98 12/31/2022 07:30 AM    CO2 24 12/31/2022 07:30 AM    BUN 12 12/31/2022 07:30 AM    CREATININE 0.79 12/31/2022 07:30 AM    ANIONGAP 7 12/31/2022 07:30 AM    ALKPHOS 73 12/30/2022 03:27 PM    ALT 10 12/30/2022 03:27 PM    AST 17 12/30/2022 03:27 PM    BILITOT 0.3 12/30/2022 03:27 PM    LABALBU 3.9 12/30/2022 03:27 PM    ALBUMIN 1.5 12/30/2022 03:27 PM    LABGLOM >60 12/31/2022 07:30 AM    PROT 6.5 12/30/2022 03:27 PM    CALCIUM 8.8 12/31/2022 07:30 AM     PT/INR:  No results found for: PTINR, PROTIME, INR  PTT: No results found for: APTT  FLP:  No results found for: CHOL, TRIG, HDL  U/A:    Lab Results   Component Value Date/Time    COLORU Yellow 12/30/2022 04:51 PM    TURBIDITY Clear 12/30/2022 04:51 PM    SPECGRAV 1.018 12/30/2022 04:51 PM    HGBUR NEGATIVE 12/30/2022 04:51 PM    PHUR 6.0 12/30/2022 04:51 PM    PROTEINU 2+ 12/30/2022 04:51 PM    GLUCOSEU NEGATIVE 12/30/2022 04:51 PM    KETUA NEGATIVE 12/30/2022 04:51 PM    BILIRUBINUR NEGATIVE 12/30/2022 04:51 PM    UROBILINOGEN Normal 12/30/2022 04:51 PM    NITRU NEGATIVE 12/30/2022 04:51 PM    LEUKOCYTESUR NEGATIVE 12/30/2022 04:51 PM     TSH:    Lab Results   Component Value Date/Time    TSH 2.85 12/30/2022 03:27 PM        Radiology:  CT HEAD WO CONTRAST    Result Date: 12/30/2022  Mild central and cortical cerebral atrophy. Mild chronic deep white matter ischemic changes No acute intracranial abnormalities are noted. .     CT THORACIC SPINE WO CONTRAST    Result Date: 12/31/2022  No definite acute traumatic injury of the thoracic or lumbar spine.  Wedging of the L2 vertebral body with sclerosis suggesting chronic compression deformity rather than an acute fracture. Posterior lumbar spinal fusion of L3-4 with posterior decompression of L3 through S1. Moderate to severe multilevel degenerative changes noted throughout the thoracic and lumbar spine as described above. Severe atherosclerotic disease. CT LUMBAR SPINE WO CONTRAST    Result Date: 12/31/2022  No definite acute traumatic injury of the thoracic or lumbar spine. Wedging of the L2 vertebral body with sclerosis suggesting chronic compression deformity rather than an acute fracture. Posterior lumbar spinal fusion of L3-4 with posterior decompression of L3 through S1. Moderate to severe multilevel degenerative changes noted throughout the thoracic and lumbar spine as described above. Severe atherosclerotic disease. US RENAL COMPLETE    Result Date: 12/30/2022  Unremarkable ultrasound of the kidneys and urinary bladder. RECOMMENDATIONS: 1. No hydronephrosis. 2. Small calcification adjacent to the right renal pelvis, which could be a ureteral stone or atherosclerotic calcification adjacent vessel. 3. Simple right renal cyst.  No further imaging follow-up is required. Unavailable     XR SHOULDER LEFT (MIN 2 VIEWS)    Result Date: 12/31/2022  1. No evident acute findings in the left shoulder with evaluation for fractures partially limited by bony demineralization. 2. Slight superior subluxation of the left humeral head could be due to an underlying left rotator cuff tear. 3. Mild osteoarthritic changes of the left kidney imaging. 4. Calcifications along the posterior margin of the left humeral head may be intra-articular loose bodies but could also be seen with calcific tendinitis, enthesopathy, or prior tendon injury.        Consultations:    Consults:     Final Specialist Recommendations/Findings:   IP CONSULT TO INTERNAL MEDICINE  IP CONSULT TO CASE MANAGEMENT      The patient was seen and examined on day of discharge and this discharge summary is in conjunction with any daily progress note from day of discharge. Discharge plan:     Disposition: Home    Physician Follow Up:     Francisco J Krishnan [facility]  [facility] [facility] [id number]  [facility]  [phone]    Schedule an appointment as soon as possible for a visit in 1 week(s)  hospital followup       Requiring Further Evaluation/Follow Up POST HOSPITALIZATION/Incidental Findings: none    Diet: regular diet    Activity: As tolerated    Instructions to Patient:   Follow-up  Check blood pressures twice daily, started on Cozaar and Norvasc 5 mg daily, follow-up with primary care physician.   Keep log of blood pressures and bring to primary care physician office  Start Remeron 7.5 mg, if partial effect can consider increasing  Discussed blood pressure medications with primary care physician  Discussed weight loss with primary care physician  Patient has slightly elevated troponins, echo was deferred as patient not having chest pain or EKG changes, recommend outpatient stress testing if family desires  Discussed coordinating home medications with primary care physician    Discharge Medications:      Medication List        START taking these medications      cefdinir 300 MG capsule  Commonly known as: OMNICEF  Take 1 capsule by mouth 2 times daily for 5 days     losartan 50 MG tablet  Commonly known as: COZAAR  Take 1 tablet by mouth daily for 14 days  Start taking on: January 1, 2023     mirtazapine 7.5 MG tablet  Commonly known as: REMERON  Take 1 tablet by mouth nightly     tamsulosin 0.4 MG capsule  Commonly known as: FLOMAX  Take 1 capsule by mouth daily for 14 days            CHANGE how you take these medications      DULoxetine HCl 40 MG Cpep  Take 40 mg by mouth daily  Start taking on: January 1, 2023  What changed:   medication strength  how much to take  when to take this            CONTINUE taking these medications      aspirin 81 MG EC tablet     atorvastatin 40 MG tablet  Commonly known as: LIPITOR     folic acid 1 MG tablet  Commonly known as: FOLVITE     gabapentin 600 MG tablet  Commonly known as: NEURONTIN     levothyroxine 100 MCG tablet  Commonly known as: SYNTHROID     metFORMIN 500 MG tablet  Commonly known as: GLUCOPHAGE     morphine 15 MG tablet  Commonly known as: MSIR     oxyCODONE 15 MG immediate release tablet  Commonly known as: OXY-IR            STOP taking these medications      atenolol 50 MG tablet  Commonly known as: TENORMIN               Where to Get Your Medications        These medications were sent to [facility] [address] [facility], [address], [address] [address]      Phone: [phone]   cefdinir 300 MG capsule  DULoxetine HCl 40 MG Cpep  losartan 50 MG tablet  mirtazapine 7.5 MG tablet  tamsulosin 0.4 MG capsule         No discharge procedures on file. Time Spent on discharge is  39 mins in patient examination, evaluation, counseling as well as medication reconciliation, prescriptions for required medications, discharge plan and follow up. Electronically signed by   Eloise Street DO  12/31/2022  2:47 PM      Thank you Dr. Donavon Arvizu MD for the opportunity to be involved in this patient's care.

## 2022-12-31 NOTE — PROGRESS NOTES
Speech Language Pathology  [address] [facility]  Speech Language Pathology    Date: 12/31/2022  Patient Name: Max Coyle  YOB: 1940   AGE: 79 y.o. MRN: [id number]        Patient Not Available for Speech Therapy     Due to:  [] Testing  [] Hemodialysis  [] Cancelled by RN  [] Surgery   [] Intubation/Sedation/Pain Medication  [] Medical instability  [x] Other: Spoke with RN, pt. Took medication without difficulty and reports Son said pt. Ate regular breakfast meal without difficulty. RN states no need for  bedside swallow study at this time. Next scheduled treatment: re-consult if necessary  Completed by: Mark Bates, SLP, M.A.  CCC-SLP

## 2022-12-31 NOTE — DISCHARGE INSTRUCTIONS
See your Primary doctor in 1 week as discussed with new medications   Take antibiotics until completed  Stop taking atenolol and take cozaar until seen by PCP   Consider stress test outpatient. Check Blood pressure twice a day, keep log and bring to primary care visit     High Blood Pressure: Care Instructions  Overview     It's normal for blood pressure to go up and down throughout the day. But if it stays up, you have high blood pressure. Another name for high blood pressure is hypertension. Despite what a lot of people think, high blood pressure usually doesn't cause headaches or make you feel dizzy or lightheaded. It usually has no symptoms. But it does increase your risk of stroke, heart attack, and other problems. You and your doctor will talk about your risks of these problems based on your blood pressure. Your doctor will give you a goal for your blood pressure. Your goal will be based on your health and your age. Lifestyle changes, such as eating healthy and being active, are always important to help lower blood pressure. You might also take medicine to reach your blood pressure goal.  Follow-up care is a key part of your treatment and safety. Be sure to make and go to all appointments, and call your doctor if you are having problems. It's also a good idea to know your test results and keep a list of the medicines you take. How can you care for yourself at home? Medical treatment  If you stop taking your medicine, your blood pressure will go back up. You may take one or more types of medicine to lower your blood pressure. Be safe with medicines. Take your medicine exactly as prescribed. Call your doctor if you think you are having a problem with your medicine. Talk to your doctor before you start taking aspirin every day. Aspirin can help certain people lower their risk of a heart attack or stroke. But taking aspirin isn't right for everyone, because it can cause serious bleeding.   See your doctor regularly. You may need to see the doctor more often at first or until your blood pressure comes down. If you are taking blood pressure medicine, talk to your doctor before you take decongestants or anti-inflammatory medicine, such as ibuprofen. Some of these medicines can raise blood pressure. Learn how to check your blood pressure at home. Lifestyle changes  Stay at a healthy weight. This is especially important if you put on weight around the waist. Losing even 10 pounds can help you lower your blood pressure. If your doctor recommends it, get more exercise. Walking is a good choice. Bit by bit, increase the amount you walk every day. Try for at least 30 minutes on most days of the week. You also may want to swim, bike, or do other activities. Avoid or limit alcohol. Talk to your doctor about whether you can drink any alcohol. Try to limit how much sodium you eat to less than 2,300 milligrams (mg) a day. Your doctor may ask you to try to eat less than 1,500 mg a day. Eat plenty of fruits (such as bananas and oranges), vegetables, legumes, whole grains, and low-fat dairy products. Lower the amount of saturated fat in your diet. Saturated fat is found in animal products such as milk, cheese, and meat. Limiting these foods may help you lose weight and also lower your risk for heart disease. Do not smoke. Smoking increases your risk for heart attack and stroke. If you need help quitting, talk to your doctor about stop-smoking programs and medicines. These can increase your chances of quitting for good. When should you call for help? Call 911  anytime you think you may need emergency care. This may mean having symptoms that suggest that your blood pressure is causing a serious heart or blood vessel problem. Your blood pressure may be over 180/120. For example, call 911 if:    You have symptoms of a heart attack.  These may include:  Chest pain or pressure, or a strange feeling in the chest.  Sweating. Shortness of breath. Nausea or vomiting. Pain, pressure, or a strange feeling in the back, neck, jaw, or upper belly or in one or both shoulders or arms. Lightheadedness or sudden weakness. A fast or irregular heartbeat. You have symptoms of a stroke. These may include:  Sudden numbness, tingling, weakness, or loss of movement in your face, arm, or leg, especially on only one side of your body. Sudden vision changes. Sudden trouble speaking. Sudden confusion or trouble understanding simple statements. Sudden problems with walking or balance. A sudden, severe headache that is different from past headaches. You have severe back or belly pain. Do not wait until your blood pressure comes down on its own. Get help right away. Call your doctor now or seek immediate care if:    Your blood pressure is much higher than normal (such as 180/120 or higher), but you don't have symptoms. You think high blood pressure is causing symptoms, such as:  Severe headache. Blurry vision. Watch closely for changes in your health, and be sure to contact your doctor if:    Your blood pressure measures higher than your doctor recommends at least 2 times. That means the top number is higher or the bottom number is higher, or both. You think you may be having side effects from your blood pressure medicine. Where can you learn more? Go to http://www.woods.com/ and enter C512 to learn more about \"High Blood Pressure: Care Instructions. \"  Current as of: October 6, 2021               Content Version: 13.5  © 2006-2022 [employer]. Care instructions adapted under license by [facility] ([facility] [place]). If you have questions about a medical condition or this instruction, always ask your healthcare professional. [place] [address] any warranty or liability for your use of this information.          Urinary Tract Infections (UTI) in Men: Care Instructions  Overview     A urinary tract infection, or UTI, is a term for an infection anywhere between the kidneys and the urethra. (The urethra is the tube that carries urine from the bladder to outside the body.) Most UTIs are bladder infections. They often cause pain or burning when you urinate. UTIs are caused by bacteria. This means they can be cured with antibiotics. Be sure to complete your treatment so that the infection does not get worse. Follow-up care is a key part of your treatment and safety. Be sure to make and go to all appointments, and call your doctor if you are having problems. It's also a good idea to know your test results and keep a list of the medicines you take. How can you care for yourself at home? Take your antibiotics as prescribed. Do not stop taking them just because you feel better. You need to take the full course of antibiotics. Take your medicines exactly as prescribed. Your doctor may have prescribed a medicine, such as phenazopyridine (Pyridium), to help relieve pain when you urinate. This turns your urine orange. You may stop taking it when your symptoms get better. But be sure to take all of your antibiotics, which treat the infection. Drink extra water for the next day or two. This will help make the urine less concentrated and help wash out the bacteria causing the infection. (If you have kidney, heart, or liver disease and have to limit your fluids, talk with your doctor before you increase your fluid intake.)  Avoid drinks that are carbonated or have caffeine. They can irritate the bladder. Urinate often. Try to empty your bladder each time. To relieve pain, take a hot bath or lay a heating pad (set on low) over your lower belly or genital area. Never go to sleep with a heating pad in place. To help prevent UTIs  Drink plenty of fluids. If you have kidney, heart, or liver disease and have to limit fluids, talk with your doctor before you increase the amount of fluids you drink.   Urinate when you have the urge. Do not hold your urine for a long time. Urinate before you go to sleep. Keep your penis clean. Catheter care  If you have a drainage tube (catheter) in place, the following steps will help you care for it. Always wash your hands before and after touching your catheter. Check the area around the urethra for inflammation or signs of infection. Signs of infection include irritated, swollen, red, or tender skin, or pus around the catheter. Clean the area around the catheter with soap and water two times a day. Dry with a clean towel afterward. Do not apply powder or lotion to the skin around the catheter. To empty the urine collection bag   Wash your hands with soap and water. Without touching the drain spout, remove the spout from its sleeve at the bottom of the collection bag. Open the valve on the spout. Let the urine flow out of the bag and into the toilet or a container. Do not let the tubing or drain spout touch anything. After you empty the bag, clean the end of the drain spout with tissue and water. Close the valve and put the drain spout back into its sleeve at the bottom of the collection bag. Wash your hands with soap and water. When should you call for help? Call your doctor now or seek immediate medical care if:    Symptoms such as a fever, chills, nausea, or vomiting get worse or happen for the first time. You have new pain in your back just below your rib cage. This is called flank pain. There is new blood or pus in your urine. You are not able to take or keep down your antibiotics. Watch closely for changes in your health, and be sure to contact your doctor if:    You are not getting better after taking an antibiotic for 2 days. Your symptoms go away but then come back. Where can you learn more? Go to http://www.woods.com/ and enter S351 to learn more about \"Urinary Tract Infections (UTI) in Men: Care Instructions. \"  Current as of: June 16, 2022               Content Version: 13.5  © 2338-0971 [employer], Incorporated. Care instructions adapted under license by [facility] ([facility]). If you have questions about a medical condition or this instruction, always ask your healthcare professional. [address] any warranty or liability for your use of this information.

## 2022-12-31 NOTE — PLAN OF CARE
Problem: Discharge Planning  Goal: Discharge to home or other facility with appropriate resources  Outcome: Progressing     Problem: Pain  Goal: Verbalizes/displays adequate comfort level or baseline comfort level  Outcome: Progressing     Problem: Safety - Adult  Goal: Free from fall injury  Outcome: Progressing     Problem: Skin/Tissue Integrity  Goal: Absence of new skin breakdown  Description: 1. Monitor for areas of redness and/or skin breakdown  2. Assess vascular access sites hourly  3. Every 4-6 hours minimum:  Change oxygen saturation probe site  4. Every 4-6 hours:  If on nasal continuous positive airway pressure, respiratory therapy assess nares and determine need for appliance change or resting period. Outcome: Progressing     Problem: Confusion  Goal: Confusion, delirium, dementia, or psychosis is improved or at baseline  Description: INTERVENTIONS:  1. Assess for possible contributors to thought disturbance, including medications, impaired vision or hearing, underlying metabolic abnormalities, dehydration, psychiatric diagnoses, and notify attending LIP  2. [place] high risk fall precautions, as indicated  3. Provide frequent short contacts to provide reality reorientation, refocusing and direction  4. Decrease environmental stimuli, including noise as appropriate  5. Monitor and intervene to maintain adequate nutrition, hydration, elimination, sleep and activity  6. If unable to ensure safety without constant attention obtain sitter and review sitter guidelines with assigned personnel  7.  Initiate Psychosocial CNS and Spiritual Care consult, as indicated  Outcome: Progressing

## 2022-12-31 NOTE — PROGRESS NOTES
Comprehensive Nutrition Assessment    Type and Reason for Visit:  Initial, Positive Nutrition Screen (Weight Loss; Poor Intakes/Appetite)    Nutrition Recommendations/Plan:   Continue current diet. Encourage/monitor PO intakes as tolerated. Will provide Glucerna oral supplements x 2 per day. Monitor labs, weights, and plan of care. Malnutrition Assessment:  Malnutrition Status: Moderate malnutrition (12/31/22 1315)    Context:  Acute Illness (on Chronic)     Findings of the 6 clinical characteristics of malnutrition:  Energy Intake:  75% or less of estimated energy requirements for 7 or more days  Weight Loss:  No significant weight loss     Body Fat Loss:  Mild body fat loss Orbital   Muscle Mass Loss:  Mild muscle mass loss Temples (temporalis), Clavicles (pectoralis & deltoids)  Fluid Accumulation:  No significant fluid accumulation   Strength:  Not Performed    Nutrition Assessment:    Pt admitted with AMS and hallucinations. PMH: dementia, DM, HTN. Pt with reports of decreased PO intakes and appetite. Observed breakfast tray which pt ate some Pashto toast and 100% reyna (~50% meal). Will provide oral supplements with meals for additional nutrition. Weight history reviewed per chart - minimal weight fluctuations noted. Labs reviewed: Na 129 mmol/L. Meds include: Remeron. Nutrition Related Findings:    Labs/Meds reviewed. Wound Type: None       Current Nutrition Intake & Therapies:    Average Meal Intake: 26-50%, 51-75%  Average Supplements Intake: None Ordered  ADULT DIET; Regular; 4 carb choices (60 gm/meal)    Anthropometric Measures:  Height: 5' 7\" (170.2 cm)  Ideal Body Weight (IBW): 148 lbs (67 kg)    Admission Body Weight: 145 lb (65.8 kg)  Current Body Weight: 145 lb (65.8 kg), 98 % IBW.     Current BMI (kg/m2): 22.7  Usual Body Weight: 150 lb 1.6 oz (68.1 kg) (2/10/22 bed scale per chart review)  % Weight Change (Calculated): -3.4                    BMI Categories: Normal Weight (BMI 18.5-24. 9)    Estimated Daily Nutrient Needs:  Energy Requirements Based On: Kcal/kg  Weight Used for Energy Requirements: Admission  Energy (kcal/day): 1795-8470 kcals/day  Weight Used for Protein Requirements: Ideal  Protein (g/day):  gm pro/day  Method Used for Fluid Requirements: 1 ml/kcal  Fluid (ml/day): 7909-4810 mL/day or per MD    Nutrition Diagnosis:   Inadequate oral intake related to cognitive or neurological impairment as evidenced by poor intake prior to admission (variable PO intakes; need for ONS)    Nutrition Interventions:   Food and/or Nutrient Delivery: Continue Current Diet, Start Oral Nutrition Supplement  Nutrition Education/Counseling: No recommendation at this time  Coordination of Nutrition Care: Continue to monitor while inpatient       Goals:  Previous Goal Met:  (Goal Set)  Goals: Meet at least 75% of estimated needs, prior to discharge       Nutrition Monitoring and Evaluation:   Behavioral-Environmental Outcomes: None Identified  Food/Nutrient Intake Outcomes: Food and Nutrient Intake, Supplement Intake  Physical Signs/Symptoms Outcomes: Biochemical Data, GI Status, Fluid Status or Edema, Nutrition Focused Physical Findings, Skin, Weight    Discharge Planning:     Too soon to determine     Darleen Damon, [address] [place] [address],   Contact: [phone]

## 2022-12-31 NOTE — PLAN OF CARE
Problem: Discharge Planning  Goal: Discharge to home or other facility with appropriate resources  12/31/2022 1012 by Francia Cardona RN  Outcome: Progressing  12/31/2022 0423 by Allie Schaefer RN  Outcome: Progressing     Problem: Pain  Goal: Verbalizes/displays adequate comfort level or baseline comfort level  12/31/2022 1012 by Francia Cardona RN  Outcome: Progressing  12/31/2022 0423 by Allie Schaefer RN  Outcome: Progressing     Problem: Safety - Adult  Goal: Free from fall injury  12/31/2022 1012 by Francia Cardona RN  Outcome: Progressing  12/31/2022 0423 by Allie Schaefer RN  Outcome: Progressing     Problem: Skin/Tissue Integrity  Goal: Absence of new skin breakdown  Description: 1. Monitor for areas of redness and/or skin breakdown  2. Assess vascular access sites hourly  3. Every 4-6 hours minimum:  Change oxygen saturation probe site  4. Every 4-6 hours:  If on nasal continuous positive airway pressure, respiratory therapy assess nares and determine need for appliance change or resting period. 12/31/2022 1012 by Francia Cardona RN  Outcome: Progressing  12/31/2022 0423 by Allie Schaefer RN  Outcome: Progressing     Problem: Confusion  Goal: Confusion, delirium, dementia, or psychosis is improved or at baseline  Description: INTERVENTIONS:  1. Assess for possible contributors to thought disturbance, including medications, impaired vision or hearing, underlying metabolic abnormalities, dehydration, psychiatric diagnoses, and notify attending LIP  2. [place] high risk fall precautions, as indicated  3. Provide frequent short contacts to provide reality reorientation, refocusing and direction  4. Decrease environmental stimuli, including noise as appropriate  5. Monitor and intervene to maintain adequate nutrition, hydration, elimination, sleep and activity  6.  If unable to ensure safety without constant attention obtain sitter and review sitter guidelines with assigned personnel  7.  Initiate Psychosocial CNS and Spiritual Care consult, as indicated  12/31/2022 1012 by Svetlana Quintana RN  Outcome: Progressing  12/31/2022 0423 by Forrest Lewis, RN  Outcome: Progressing

## 2023-01-01 LAB
CULTURE: NORMAL
CULTURE: NORMAL
EKG ATRIAL RATE: 56 BPM
EKG P AXIS: 48 DEGREES
EKG P-R INTERVAL: 194 MS
EKG Q-T INTERVAL: 444 MS
EKG QRS DURATION: 100 MS
EKG QTC CALCULATION (BAZETT): 428 MS
EKG R AXIS: -3 DEGREES
EKG T AXIS: 17 DEGREES
EKG VENTRICULAR RATE: 56 BPM
Lab: NORMAL
Lab: NORMAL
SPECIMEN DESCRIPTION: NORMAL
SPECIMEN DESCRIPTION: NORMAL

## 2023-01-01 NOTE — PROGRESS NOTES
Patient stable, no complaints. Discharge home with all belongings. Discharge instructions given, patient and relative verbalizes understanding. Patient went home via wheelchair.

## 2023-01-02 LAB
EKG ATRIAL RATE: 56 BPM
EKG P AXIS: 48 DEGREES
EKG P-R INTERVAL: 194 MS
EKG Q-T INTERVAL: 444 MS
EKG QRS DURATION: 100 MS
EKG QTC CALCULATION (BAZETT): 428 MS
EKG R AXIS: -3 DEGREES
EKG T AXIS: 17 DEGREES
EKG VENTRICULAR RATE: 56 BPM

## 2023-01-02 PROCEDURE — 93010 ELECTROCARDIOGRAM REPORT: CPT | Performed by: INTERNAL MEDICINE

## 2023-01-02 RX ORDER — DULOXETINE 40 MG/1
40 CAPSULE, DELAYED RELEASE ORAL DAILY
Qty: 30 CAPSULE | Refills: 3 | Status: SHIPPED | OUTPATIENT
Start: 2023-01-02

## 2023-01-02 RX ORDER — TAMSULOSIN HYDROCHLORIDE 0.4 MG/1
0.4 CAPSULE ORAL DAILY
Qty: 14 CAPSULE | Refills: 0 | Status: SHIPPED | OUTPATIENT
Start: 2023-01-02 | End: 2023-01-16

## 2023-01-02 RX ORDER — MIRTAZAPINE 7.5 MG/1
7.5 TABLET, FILM COATED ORAL NIGHTLY
Qty: 30 TABLET | Refills: 3 | Status: SHIPPED | OUTPATIENT
Start: 2023-01-02

## 2023-01-02 RX ORDER — LOSARTAN POTASSIUM 50 MG/1
50 TABLET ORAL DAILY
Qty: 14 TABLET | Refills: 0 | Status: SHIPPED | OUTPATIENT
Start: 2023-01-02 | End: 2023-01-16

## 2023-01-02 RX ORDER — AMLODIPINE BESYLATE 5 MG/1
10 TABLET ORAL DAILY
Qty: 60 TABLET | Refills: 0 | Status: SHIPPED | OUTPATIENT
Start: 2023-01-02 | End: 2023-02-01

## 2023-01-02 RX ORDER — CEFDINIR 300 MG/1
300 CAPSULE ORAL 2 TIMES DAILY
Qty: 10 CAPSULE | Refills: 0 | Status: SHIPPED | OUTPATIENT
Start: 2023-01-02 | End: 2023-01-07

## 2023-01-04 LAB
CULTURE: NORMAL
CULTURE: NORMAL
Lab: NORMAL
Lab: NORMAL
SPECIMEN DESCRIPTION: NORMAL
SPECIMEN DESCRIPTION: NORMAL